# Patient Record
Sex: MALE | Race: WHITE | NOT HISPANIC OR LATINO | Employment: FULL TIME | URBAN - METROPOLITAN AREA
[De-identification: names, ages, dates, MRNs, and addresses within clinical notes are randomized per-mention and may not be internally consistent; named-entity substitution may affect disease eponyms.]

---

## 2020-09-18 ENCOUNTER — OFFICE VISIT (OUTPATIENT)
Dept: FAMILY MEDICINE CLINIC | Facility: CLINIC | Age: 30
End: 2020-09-18

## 2020-09-18 VITALS
HEART RATE: 77 BPM | OXYGEN SATURATION: 98 % | WEIGHT: 218 LBS | RESPIRATION RATE: 16 BRPM | DIASTOLIC BLOOD PRESSURE: 82 MMHG | SYSTOLIC BLOOD PRESSURE: 120 MMHG | HEIGHT: 74 IN | TEMPERATURE: 97.8 F | BODY MASS INDEX: 27.98 KG/M2

## 2020-09-18 DIAGNOSIS — F10.10 ALCOHOL ABUSE: ICD-10-CM

## 2020-09-18 DIAGNOSIS — R53.83 OTHER FATIGUE: ICD-10-CM

## 2020-09-18 DIAGNOSIS — J34.2 DEVIATED SEPTUM: ICD-10-CM

## 2020-09-18 DIAGNOSIS — Z13.220 SCREENING, LIPID: ICD-10-CM

## 2020-09-18 DIAGNOSIS — R06.83 SNORING: ICD-10-CM

## 2020-09-18 DIAGNOSIS — R06.81 WITNESSED APNEIC SPELLS: ICD-10-CM

## 2020-09-18 DIAGNOSIS — E10.649 TYPE 1 DIABETES MELLITUS WITH HYPOGLYCEMIA AND WITHOUT COMA (HCC): Primary | ICD-10-CM

## 2020-09-18 DIAGNOSIS — F51.01 PRIMARY INSOMNIA: ICD-10-CM

## 2020-09-18 DIAGNOSIS — K30 INDIGESTION: ICD-10-CM

## 2020-09-18 DIAGNOSIS — F41.9 ANXIETY: ICD-10-CM

## 2020-09-18 PROCEDURE — 99204 OFFICE O/P NEW MOD 45 MIN: CPT | Performed by: NURSE PRACTITIONER

## 2020-09-18 RX ORDER — LORAZEPAM 0.5 MG/1
0.5 TABLET ORAL EVERY 8 HOURS PRN
Qty: 30 TABLET | Refills: 0 | Status: SHIPPED | OUTPATIENT
Start: 2020-09-18 | End: 2022-08-29

## 2020-09-18 RX ORDER — VILAZODONE HYDROCHLORIDE 10 MG/1
10 TABLET ORAL DAILY
Qty: 7 TABLET | Refills: 0 | Status: SHIPPED | OUTPATIENT
Start: 2020-09-18 | End: 2020-10-07 | Stop reason: DRUGHIGH

## 2020-09-18 RX ORDER — INSULIN GLARGINE 100 [IU]/ML
36 INJECTION, SOLUTION SUBCUTANEOUS DAILY
COMMUNITY
End: 2022-08-29

## 2020-09-18 RX ORDER — VILAZODONE HYDROCHLORIDE 20 MG/1
20 TABLET ORAL DAILY
Qty: 7 TABLET | Refills: 0 | Status: SHIPPED | OUTPATIENT
Start: 2020-09-18 | End: 2020-10-07 | Stop reason: DRUGHIGH

## 2020-09-18 RX ORDER — VILAZODONE HYDROCHLORIDE 40 MG/1
40 TABLET ORAL DAILY
Qty: 90 TABLET | Refills: 1 | Status: SHIPPED | OUTPATIENT
Start: 2020-09-18 | End: 2022-08-29

## 2020-09-18 RX ORDER — PANTOPRAZOLE SODIUM 40 MG/1
40 TABLET, DELAYED RELEASE ORAL DAILY
Qty: 30 TABLET | Refills: 0 | Status: SHIPPED | OUTPATIENT
Start: 2020-09-18 | End: 2022-08-29

## 2020-09-18 NOTE — PROGRESS NOTES
Subjective   Kieran Canela is a 30 y.o. male.     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 Was diagnosed at 18 with sleep apnea but was never followed up on  Melatonin OTC no longer helping; drinking alcohol at night to help with sleep but really wants to stop this  Deviated septum    Vapes using 4mg nicotine mostly  never uses marijuana or illicit substances    The following portions of the patient's history were reviewed and updated as appropriate: allergies, current medications, past family history, past medical history, past social history, past surgical history and problem list.    Review of Systems   Constitutional: Positive for fatigue. Negative for activity change, appetite change, unexpected weight gain and unexpected weight loss.   HENT: Negative.    Eyes: Negative.  Negative for blurred vision.   Respiratory: Negative.  Negative for cough.    Cardiovascular: Negative.  Negative for chest pain and palpitations.   Gastrointestinal: Negative.    Endocrine: Negative.    Genitourinary: Negative.    Musculoskeletal: Negative.    Skin: Negative.    Allergic/Immunologic: Negative.    Neurological: Negative.    Hematological: Negative.    Psychiatric/Behavioral: Negative.  Negative for sleep disturbance and depressed mood. The patient is not nervous/anxious.     All other systems reviewed and are negative.      Objective   Physical Exam  Vitals signs reviewed.   Constitutional:       General: He is not in acute distress.     Appearance: Normal appearance. He is normal weight. He is not ill-appearing, toxic-appearing or diaphoretic.   HENT:      Head: Normocephalic and atraumatic.      Nose: Nose normal.   Eyes:      General: Lids are normal.      Pupils: Pupils are equal, round, and reactive to light.   Neck:      Musculoskeletal: Normal range of motion and neck supple. No neck rigidity or muscular tenderness.      Thyroid: No thyroid mass, thyromegaly or thyroid tenderness.      Vascular: No carotid bruit or JVD.      Trachea: Trachea and phonation normal.   Cardiovascular:      Rate and Rhythm: Normal rate and regular rhythm.      Pulses: Normal pulses.      Heart sounds: Normal heart sounds, S1 normal and S2 normal. No murmur. No friction rub. No gallop.    Pulmonary:      Effort: Pulmonary effort is normal.      Breath sounds: Normal breath sounds. No decreased breath sounds or wheezing.   Abdominal:      General: Abdomen is flat. Bowel sounds are normal.      Palpations: Abdomen is soft. There is no mass.      Tenderness: There is no abdominal tenderness.   Musculoskeletal:      Right lower leg: No edema.      Left lower leg: No edema.   Lymphadenopathy:      Head:      Right side of head: No tonsillar, preauricular or posterior auricular adenopathy.      Left side of head: No tonsillar, preauricular or posterior auricular adenopathy.      Cervical: No cervical adenopathy.   Skin:     General: Skin is warm and moist.      Capillary Refill: Capillary refill takes less than 2 seconds.   Neurological:      Mental Status: He is alert and oriented to person, place, and time.      Gait: Gait normal.      Deep Tendon Reflexes:      Reflex Scores:       Patellar reflexes are 2+ on the right side and 2+ on the left side.  Psychiatric:         Attention and Perception:  Attention and perception normal.         Mood and Affect: Mood is anxious. Mood is not depressed. Affect is not labile, flat or tearful.         Speech: Speech normal.         Behavior: Behavior normal. Behavior is not agitated, slowed, aggressive or withdrawn.         Thought Content: Thought content normal. Thought content is not paranoid or delusional.         Cognition and Memory: Cognition and memory normal.         Judgment: Judgment normal.           Assessment/Plan   Kieran was seen today for establish care, anxiety and insomnia.    Diagnoses and all orders for this visit:    Type 1 diabetes mellitus with hypoglycemia and without coma (CMS/[facility])  -     Hemoglobin A1c  -     Comprehensive Metabolic Panel  -     CBC & Differential  -     TSH    Primary insomnia    Other fatigue  -     Comprehensive Metabolic Panel  -     CBC & Differential  -     TSH  -     T4    Anxiety  -     LORazepam (Ativan) 0.5 MG tablet; Take 1 tablet by mouth Every 8 (Eight) Hours As Needed for Anxiety.  -     vilazodone (Viibryd) 10 MG tablet tablet; Take 1 tablet by mouth Daily.  -     vilazodone (Viibryd) 20 MG tablet tablet; Take 1 tablet by mouth Daily.  -     vilazodone (Viibryd) 40 MG tablet tablet; Take 1 tablet by mouth Daily.    Screening, lipid  -     Lipid panel    Alcohol abuse    will check labs and notify pt of results  Will start pt on Viibryd for anxiety since it can work quickly and have low sexual side effects and give a few Ativan to use as needed  Pt to avoid using alcohol if taking Ativan, he agrees and understands that this can cause respiratory suppression if takes Ativan with alcohol especially with suspected sleep apnea.  Will refer to sleep medicine to evaluate for sleep disorder or obstructive issue.  Will start pt on Pantoprazole for 1 month to see if this helps stomach discomfort. Avoid spicy foods and alcohol  Will see pt back in 3 months for follow up and 4 weeks for anxiety.  Needs to make appointment  with Endocrinology for evaluation and keep regular follow ups.  Pt agrees.  Off tonight from work to get started on medication, can resume work on Monday night 9/20/20

## 2020-09-19 LAB
ALBUMIN SERPL-MCNC: 4.8 G/DL (ref 4.1–5.2)
ALBUMIN/GLOB SERPL: 2.1 {RATIO} (ref 1.2–2.2)
ALP SERPL-CCNC: 92 IU/L (ref 39–117)
ALT SERPL-CCNC: 21 IU/L (ref 0–44)
AST SERPL-CCNC: 25 IU/L (ref 0–40)
BASOPHILS # BLD AUTO: 0.1 X10E3/UL (ref 0–0.2)
BASOPHILS NFR BLD AUTO: 2 %
BILIRUB SERPL-MCNC: 0.4 MG/DL (ref 0–1.2)
BUN SERPL-MCNC: 12 MG/DL (ref 6–20)
BUN/CREAT SERPL: 12 (ref 9–20)
CALCIUM SERPL-MCNC: 9.6 MG/DL (ref 8.7–10.2)
CHLORIDE SERPL-SCNC: 100 MMOL/L (ref 96–106)
CHOLEST SERPL-MCNC: 155 MG/DL (ref 100–199)
CO2 SERPL-SCNC: 25 MMOL/L (ref 20–29)
CREAT SERPL-MCNC: 0.98 MG/DL (ref 0.76–1.27)
EOSINOPHIL # BLD AUTO: 0.3 X10E3/UL (ref 0–0.4)
EOSINOPHIL NFR BLD AUTO: 7 %
ERYTHROCYTE [DISTWIDTH] IN BLOOD BY AUTOMATED COUNT: 12.7 % (ref 11.6–15.4)
GLOBULIN SER CALC-MCNC: 2.3 G/DL (ref 1.5–4.5)
GLUCOSE SERPL-MCNC: 102 MG/DL (ref 65–99)
HBA1C MFR BLD: 8.7 % (ref 4.8–5.6)
HCT VFR BLD AUTO: 46.1 % (ref 37.5–51)
HDLC SERPL-MCNC: 64 MG/DL
HGB BLD-MCNC: 15.7 G/DL (ref 13–17.7)
IMM GRANULOCYTES # BLD AUTO: 0 X10E3/UL (ref 0–0.1)
IMM GRANULOCYTES NFR BLD AUTO: 0 %
LDLC SERPL CALC-MCNC: 78 MG/DL (ref 0–99)
LYMPHOCYTES # BLD AUTO: 1.5 X10E3/UL (ref 0.7–3.1)
LYMPHOCYTES NFR BLD AUTO: 37 %
MCH RBC QN AUTO: 29.5 PG (ref 26.6–33)
MCHC RBC AUTO-ENTMCNC: 34.1 G/DL (ref 31.5–35.7)
MCV RBC AUTO: 87 FL (ref 79–97)
MONOCYTES # BLD AUTO: 0.5 X10E3/UL (ref 0.1–0.9)
MONOCYTES NFR BLD AUTO: 11 %
NEUTROPHILS # BLD AUTO: 1.7 X10E3/UL (ref 1.4–7)
NEUTROPHILS NFR BLD AUTO: 43 %
PLATELET # BLD AUTO: 340 X10E3/UL (ref 150–450)
POTASSIUM SERPL-SCNC: 4.3 MMOL/L (ref 3.5–5.2)
PROT SERPL-MCNC: 7.1 G/DL (ref 6–8.5)
RBC # BLD AUTO: 5.33 X10E6/UL (ref 4.14–5.8)
SODIUM SERPL-SCNC: 141 MMOL/L (ref 134–144)
T4 SERPL-MCNC: 7.3 UG/DL (ref 4.5–12)
TRIGL SERPL-MCNC: 64 MG/DL (ref 0–149)
TSH SERPL DL<=0.005 MIU/L-ACNC: 3.54 UIU/ML (ref 0.45–4.5)
VLDLC SERPL CALC-MCNC: 13 MG/DL (ref 5–40)
WBC # BLD AUTO: 4.1 X10E3/UL (ref 3.4–10.8)

## 2020-09-21 ENCOUNTER — TELEPHONE (OUTPATIENT)
Dept: FAMILY MEDICINE CLINIC | Facility: CLINIC | Age: 30
End: 2020-09-21

## 2020-09-21 NOTE — TELEPHONE ENCOUNTER
LOV 1/17/20  F/U appt booked 5/4/20 PATIENT CALLED AND SAID [facility] PAPERWORK WAS SUPPOSED TO HAVE BEEN FAXED IN ON Friday 9/18.  HE'S TRYING TO CONFIRM THAT IT WAS RECEIVED. THE  SAID THEY DON'T SEE ANYTHING YET.    PLEASE CONTACT THE PATIENT TO ADVISE.    CALLBACK:  [phone]

## 2020-09-22 NOTE — TELEPHONE ENCOUNTER
I haven't seen any FMLA forms, plus pt was only seen on Friday and did not bring any forms with him. I will complete them as soon as I get them and let the patient know. julian

## 2020-10-07 ENCOUNTER — TELEPHONE (OUTPATIENT)
Dept: FAMILY MEDICINE CLINIC | Facility: CLINIC | Age: 30
End: 2020-10-07

## 2020-10-07 DIAGNOSIS — F51.01 PRIMARY INSOMNIA: Primary | ICD-10-CM

## 2020-10-07 RX ORDER — QUETIAPINE FUMARATE 50 MG/1
50 TABLET, FILM COATED ORAL NIGHTLY
Qty: 90 TABLET | Refills: 0 | Status: SHIPPED | OUTPATIENT
Start: 2020-10-07 | End: 2022-08-29

## 2020-10-07 NOTE — TELEPHONE ENCOUNTER
Is pt needing something to help with sleep? Or anxiety? Is he taking Viibryd? Did he stop drinking alcohol? ajc

## 2020-10-07 NOTE — TELEPHONE ENCOUNTER
Patient is calling about LORazepam (Ativan) 0.5 MG tablet.  Patient states that it is not helping him and would like to now if there is something else he can try.  Please advise      navdeep Grierson call back [phone]

## 2020-10-07 NOTE — TELEPHONE ENCOUNTER
Spoke with pt he is aware taking the viibryd, however he is having racing thoughts at night that is making it hard to sleep. Per pt he is not using alcohol.

## 2020-10-14 ENCOUNTER — NURSE TRIAGE (OUTPATIENT)
Dept: CALL CENTER | Facility: HOSPITAL | Age: 30
End: 2020-10-14

## 2020-10-14 NOTE — TELEPHONE ENCOUNTER
"Caller states he wants to let his PCP know he has stopped taking Seroquel and Viibryd because he thinks they are making his BG high and they were not working for him. States he will call office tomorrow afternoon to discuss this with his PCP.     Reason for Disposition  • [1] Caller has NON-URGENT medication question about med that PCP prescribed AND [2] triager unable to answer question    Additional Information  • Negative: Drug overdose and triager unable to answer question  • Negative: Caller requesting information unrelated to medicine  • Negative: Caller requesting a prescription for Strep throat and has a positive culture result  • Negative: Rash while taking a medication or within 3 days of stopping it  • Negative: Immunization reaction suspected  • Negative: [1] Asthma and [2] having symptoms of asthma (cough, wheezing, etc.)  • Negative: [1] Influenza symptoms AND [2] anti-viral med prescription request, such as Tamiflu  • Negative: [1] Symptom of illness (e.g., headache, abdominal pain, earache, vomiting) AND [2] more than mild  • Negative: MORE THAN A DOUBLE DOSE of a prescription or over-the-counter (OTC) drug  • Negative: [1] DOUBLE DOSE (an extra dose or lesser amount) of over-the-counter (OTC) drug AND [2] any symptoms (e.g., dizziness, nausea, pain, sleepiness)  • Negative: [1] DOUBLE DOSE (an extra dose or lesser amount) of prescription drug AND [2] any symptoms (e.g., dizziness, nausea, pain, sleepiness)  • Negative: Took another person's prescription drug  • Negative: [1] DOUBLE DOSE (an extra dose or lesser amount) of prescription drug AND [2] NO symptoms (Exception: a double dose of antibiotics)  • Negative: Diabetes drug error or overdose (e.g., took wrong type of insulin or took extra dose)  • Negative: [1] Request for URGENT new prescription or refill of \"essential\" medication (i.e., likelihood of harm to patient if not taken) AND [2] triager unable to fill per unit policy  • Negative: [1] " "Prescription not at pharmacy AND [2] was prescribed by PCP recently  • Negative: [1] Pharmacy calling with prescription questions AND [2] triager unable to answer question  • Negative: [1] Caller has URGENT medication question about med that PCP or specialist prescribed AND [2] triager unable to answer question    Answer Assessment - Initial Assessment Questions  1.   NAME of MEDICATION: \"What medicine are you calling about?\"      Seraquel and Viibryd  2.   QUESTION: \"What is your question?\"      Want to let his PCP know he had stopped taking these because he thinks they are making his BG too high.   3.   PRESCRIBING HCP: \"Who prescribed it?\" Reason: if prescribed by specialist, call should be referred to that group.      Chana Womack APRN  4. SYMPTOMS: \"Do you have any symptoms?\"      High blood sugar  5. SEVERITY: If symptoms are present, ask \"Are they mild, moderate or severe?\"      moderate  6.  PREGNANCY:  \"Is there any chance that you are pregnant?\" \"When was your last menstrual period?\"      na    Protocols used: MEDICATION QUESTION CALL-ADULT-      "

## 2020-10-15 ENCOUNTER — TELEPHONE (OUTPATIENT)
Dept: FAMILY MEDICINE CLINIC | Facility: CLINIC | Age: 30
End: 2020-10-15

## 2020-10-15 DIAGNOSIS — F41.9 ANXIETY: ICD-10-CM

## 2020-10-15 DIAGNOSIS — F51.01 PRIMARY INSOMNIA: Primary | ICD-10-CM

## 2021-03-05 ENCOUNTER — TELEPHONE (OUTPATIENT)
Dept: FAMILY MEDICINE CLINIC | Facility: CLINIC | Age: 31
End: 2021-03-05

## 2021-03-05 NOTE — TELEPHONE ENCOUNTER
Caller: SrikanthbeKieran coffey    Relationship: Self    Best call back number: [phone] LEAVE A MESSAGE IF NO ANSWER     What medication are you requesting: NYSTATIN AND TRIAMCINOLONE ACETONIDE  CREAM    What are your current symptoms: BURNING AND ITCHY AROUND ANAL AREA    How long have you been experiencing symptoms:  ABOUT PROGRESSING OVER LAST 2 WEEKS    Have you had these symptoms before:    [x] Yes  [] No    Have you been treated for these symptoms before:   [x] Yes  [] No    If a prescription is needed, what is your preferred pharmacy and phone number: [facility] [address] [place] [address] [phone] [facility] [phone]      Additional notes:PATIENT STATED THAT HE KNOWS HE HAS A YEAST INFECTION HE HAS HAD THIS BEFORE BUT GOT IT TREATED AT  AND THIS IS THE MEDICATION THAT WAS PRESCRIBED TO HIM BEFORE    PLEASE ADVISE

## 2021-03-08 NOTE — TELEPHONE ENCOUNTER
PATIENT CALLED TO CHECK ON THE STATUS OF THIS MEDICATION REQUEST.    PATIENT CALL BACK [phone]

## 2021-03-09 NOTE — TELEPHONE ENCOUNTER
None of these medications on his list.     Needs to make office visit or can use OTC Clotrimazole and Hydrocortisone cream which are similar to what he is requesting. [email or web address]

## 2021-05-22 NOTE — TELEPHONE ENCOUNTER
I am going to have to refer you to Psychiatry for help with your anxiety and insomnia. I will place a referral however you may want to check with your insurance to find out who is in network. We use [facility] a lot because they have several locations in [place] and surrounding [facility] and have Psychiatrist and Psych Nurse Practitioners that can prescribe medications for you and are able to get pt in pretty quickly.   Once you get on medication that has improved your symptoms I will be happy to take over your treatment. [facility]  
Informed pt what Chana stated and that referral had been placed  
PATIENT STATES HE HAS DISCONTINUED vilazodone (Viibryd) 40 MG tablet tablet AND   QUEtiapine (SEROquel) 50 MG tablet.      HE FEELS THE VIIBRYD IS CAUSING HIS SUGAR TO SPIKE WHICH TAKES SEVERAL HOURS TO GET SUGAR TO DECREASE.      HE FEELS SEROQUEL IS CAUSING BAD DREAMS.    PATIENT WOULD LIKE ALTERNATIVE MEDICATIONS.     BEST CALL BACK  [phone]  
No

## 2022-08-29 ENCOUNTER — TELEPHONE (OUTPATIENT)
Dept: FAMILY MEDICINE CLINIC | Facility: CLINIC | Age: 32
End: 2022-08-29

## 2022-08-29 ENCOUNTER — OFFICE VISIT (OUTPATIENT)
Dept: FAMILY MEDICINE CLINIC | Facility: CLINIC | Age: 32
End: 2022-08-29

## 2022-08-29 VITALS
WEIGHT: 236 LBS | OXYGEN SATURATION: 98 % | HEART RATE: 80 BPM | TEMPERATURE: 98.9 F | RESPIRATION RATE: 16 BRPM | HEIGHT: 74 IN | DIASTOLIC BLOOD PRESSURE: 82 MMHG | SYSTOLIC BLOOD PRESSURE: 126 MMHG | BODY MASS INDEX: 30.29 KG/M2

## 2022-08-29 DIAGNOSIS — E10.65 TYPE 1 DIABETES MELLITUS WITH HYPERGLYCEMIA: ICD-10-CM

## 2022-08-29 DIAGNOSIS — B37.2 SKIN YEAST INFECTION: Primary | ICD-10-CM

## 2022-08-29 PROBLEM — K31.84 GASTROPARESIS: Status: ACTIVE | Noted: 2021-06-14

## 2022-08-29 PROCEDURE — 99213 OFFICE O/P EST LOW 20 MIN: CPT | Performed by: FAMILY MEDICINE

## 2022-08-29 RX ORDER — INSULIN LISPRO 100 [IU]/ML
INJECTION, SOLUTION INTRAVENOUS; SUBCUTANEOUS
COMMUNITY
Start: 2022-06-13

## 2022-08-29 NOTE — PROGRESS NOTES
"Chief Complaint  Establish Care (Last seen 2020-off boarding from Chana Womack. ) and Requesting cream for yeast infection    Subjective          Kieran Canela presents to [facility]  History of Present Illness   MELODY Moncada previous patient   Sees endocrinology at  for treatment of DM type 1. He has insulin pump. Overdue for follow up    He is having issues with yeast infection in groin and buttock region   This is a recurrent issue for him  Treats with a topical cream, but out of medication. Requesting a refill.     The following portions of the patient's history were reviewed and updated as appropriate: allergies, current medications, past family history, past medical history, past social history, past surgical history and problem list.    Objective   Vital Signs:   /82   Pulse 80   Temp 98.9 °F (37.2 °C)   Resp 16   Ht 188 cm (74\")   Wt 107 kg (236 lb)   SpO2 98%   BMI 30.30 kg/m²     Physical Exam  Vitals and nursing note reviewed.   Constitutional:       Appearance: Normal appearance. He is well-developed.   HENT:      Head: Normocephalic and atraumatic.      Right Ear: External ear normal.      Left Ear: External ear normal.   Eyes:      Conjunctiva/sclera: Conjunctivae normal.   Cardiovascular:      Rate and Rhythm: Normal rate and regular rhythm.      Heart sounds: Normal heart sounds.   Pulmonary:      Effort: Pulmonary effort is normal.      Breath sounds: Normal breath sounds.   Skin:     General: Skin is dry.   Neurological:      General: No focal deficit present.      Mental Status: He is alert and oriented to person, place, and time.        Result Review :                 Assessment and Plan    Diagnoses and all orders for this visit:    1. Skin yeast infection (Primary)  -     nystatin-triamcinolone (MYCOLOG II) 920638-7.1 UNIT/GM-% cream; Apply 1 application topically to the appropriate area as directed 2 (Two) Times a Day As Needed (rash). Apply " sparingly two times a day as needed.  Dispense: 60 g; Refill: 0    2. Type 1 diabetes mellitus with hyperglycemia (HCC)  Comments:  He would like to speak with nutritionist, referred to diabetic education. Will notify me if he wants referral to our endocrinology     Orders:  -     Ambulatory Referral to Diabetic Education        Follow Up   Return if symptoms worsen or fail to improve.  Patient was given instructions and counseling regarding his condition or for health maintenance advice. Please see specific information pulled into the AVS if appropriate.

## 2022-08-29 NOTE — TELEPHONE ENCOUNTER
Caller: Kieran Canela    Relationship: Self    Best call back number: [phone]    What medication are you requesting: ANYTHING FOR YEAST INFECTION    What are your current symptoms: YEAST INFECTION    Have you had these symptoms before:    [x] Yes  [] No    Have you been treated for these symptoms before:   [x] Yes  [] No    If a prescription is needed, what is your preferred pharmacy and phone number: [facility] [address] [place] [address] [phone] [facility] [phone]

## 2022-10-14 ENCOUNTER — OFFICE VISIT (OUTPATIENT)
Dept: FAMILY MEDICINE CLINIC | Facility: CLINIC | Age: 32
End: 2022-10-14

## 2022-10-14 VITALS
SYSTOLIC BLOOD PRESSURE: 132 MMHG | HEIGHT: 74 IN | HEART RATE: 78 BPM | WEIGHT: 237 LBS | BODY MASS INDEX: 30.42 KG/M2 | RESPIRATION RATE: 18 BRPM | TEMPERATURE: 97.5 F | DIASTOLIC BLOOD PRESSURE: 84 MMHG

## 2022-10-14 DIAGNOSIS — E78.00 PURE HYPERCHOLESTEROLEMIA: ICD-10-CM

## 2022-10-14 DIAGNOSIS — R79.89 ABNORMAL THYROID BLOOD TEST: ICD-10-CM

## 2022-10-14 DIAGNOSIS — E55.9 VITAMIN D DEFICIENCY: ICD-10-CM

## 2022-10-14 DIAGNOSIS — E10.649 TYPE 1 DIABETES MELLITUS WITH HYPOGLYCEMIA AND WITHOUT COMA: Primary | ICD-10-CM

## 2022-10-14 DIAGNOSIS — D64.9 ANEMIA, UNSPECIFIED TYPE: ICD-10-CM

## 2022-10-14 DIAGNOSIS — R53.82 CHRONIC FATIGUE: ICD-10-CM

## 2022-10-14 PROCEDURE — 99214 OFFICE O/P EST MOD 30 MIN: CPT | Performed by: FAMILY MEDICINE

## 2022-10-14 NOTE — PROGRESS NOTES
Subjective   Kieran Canela is a 32 y.o. male.     History of Present Illness     He is seeing [facility] for his DM  He is currently on a pump  rarely sees them though  Has not had levels checked in quite a while  He has had some changes in his life and he is worried that his DM control is not going to be good  They are adjusting his levels but have not done so in a long time  He has had high numbers at night but are normal in the day  Basal rate is 0.2      Pt is not taking any cholesterol medicine    He is tired as well  Has recently changed shifts and     Was told his thyroid was off one time  Not sure exactly what was going on    The following portions of the patient's history were reviewed and updated as appropriate: allergies, current medications, past family history, past medical history, past social history, past surgical history and problem list.    Review of Systems   Constitutional: Negative.    Psychiatric/Behavioral: Negative.        Objective   Physical Exam  Vitals and nursing note reviewed.   Constitutional:       General: He is not in acute distress.     Appearance: Normal appearance. He is well-developed.   Cardiovascular:      Rate and Rhythm: Normal rate and regular rhythm.      Heart sounds: Normal heart sounds.   Pulmonary:      Effort: Pulmonary effort is normal.      Breath sounds: Normal breath sounds.   Abdominal:      General: Abdomen is flat. Bowel sounds are normal.      Tenderness: There is no guarding or rebound.      Comments: Pump in place   Neurological:      Mental Status: He is alert and oriented to person, place, and time.   Psychiatric:         Mood and Affect: Mood normal.         Behavior: Behavior normal.         Thought Content: Thought content normal.         Judgment: Judgment normal.         Assessment & Plan   Diagnoses and all orders for this visit:    1. Type 1 diabetes mellitus with hypoglycemia and without coma (HCC) (Primary)  -     CBC & Differential  -      Comprehensive Metabolic Panel  -     Hemoglobin A1c    2. Pure hypercholesterolemia  -     Comprehensive Metabolic Panel  -     Lipid Panel    3. Chronic fatigue  -     Comprehensive Metabolic Panel  -     Testosterone  -     Vitamin B12 & Folate  -     Iron Profile    4. Anemia, unspecified type  -     Vitamin B12 & Folate  -     Iron Profile    5. Vitamin D deficiency  -     Vitamin D 25 Hydroxy    6. Abnormal thyroid blood test  -     TSH+Free T4    he has not seen his endocrinologist for pump nor regular follows ups since eraly this year.  Discussed with pt that this is very important for his overall health and he needs to see them..  Sugars are ok in the day but high in the PM.  Not on any oral medicine at this time.  Will check labs and consider farxiga for better control  He is tired, we will check labs but expect poor DM control to be an issue!  Has not been taking cholesterol medicine, will check lipids but may need statin.

## 2022-10-15 LAB
25(OH)D3+25(OH)D2 SERPL-MCNC: 27.8 NG/ML (ref 30–100)
ALBUMIN SERPL-MCNC: 4.7 G/DL (ref 4.1–5.2)
ALBUMIN/GLOB SERPL: 2.2 {RATIO} (ref 1.2–2.2)
ALP SERPL-CCNC: 106 IU/L (ref 44–121)
ALT SERPL-CCNC: 24 IU/L (ref 0–44)
AST SERPL-CCNC: 18 IU/L (ref 0–40)
BASOPHILS # BLD AUTO: 0.1 X10E3/UL (ref 0–0.2)
BASOPHILS NFR BLD AUTO: 1 %
BILIRUB SERPL-MCNC: 0.5 MG/DL (ref 0–1.2)
BUN SERPL-MCNC: 12 MG/DL (ref 6–20)
BUN/CREAT SERPL: 13 (ref 9–20)
CALCIUM SERPL-MCNC: 9.3 MG/DL (ref 8.7–10.2)
CHLORIDE SERPL-SCNC: 95 MMOL/L (ref 96–106)
CHOLEST SERPL-MCNC: 174 MG/DL (ref 100–199)
CO2 SERPL-SCNC: 23 MMOL/L (ref 20–29)
CREAT SERPL-MCNC: 0.96 MG/DL (ref 0.76–1.27)
EGFRCR SERPLBLD CKD-EPI 2021: 109 ML/MIN/1.73
EOSINOPHIL # BLD AUTO: 0.2 X10E3/UL (ref 0–0.4)
EOSINOPHIL NFR BLD AUTO: 4 %
ERYTHROCYTE [DISTWIDTH] IN BLOOD BY AUTOMATED COUNT: 12.2 % (ref 11.6–15.4)
FOLATE SERPL-MCNC: 16 NG/ML
GLOBULIN SER CALC-MCNC: 2.1 G/DL (ref 1.5–4.5)
GLUCOSE SERPL-MCNC: 351 MG/DL (ref 70–99)
HBA1C MFR BLD: 9.5 % (ref 4.8–5.6)
HCT VFR BLD AUTO: 46.6 % (ref 37.5–51)
HDLC SERPL-MCNC: 55 MG/DL
HGB BLD-MCNC: 15.2 G/DL (ref 13–17.7)
IMM GRANULOCYTES # BLD AUTO: 0 X10E3/UL (ref 0–0.1)
IMM GRANULOCYTES NFR BLD AUTO: 0 %
IRON SATN MFR SERPL: 25 % (ref 15–55)
IRON SERPL-MCNC: 90 UG/DL (ref 38–169)
LDLC SERPL CALC-MCNC: 108 MG/DL (ref 0–99)
LYMPHOCYTES # BLD AUTO: 1.6 X10E3/UL (ref 0.7–3.1)
LYMPHOCYTES NFR BLD AUTO: 34 %
MCH RBC QN AUTO: 28.7 PG (ref 26.6–33)
MCHC RBC AUTO-ENTMCNC: 32.6 G/DL (ref 31.5–35.7)
MCV RBC AUTO: 88 FL (ref 79–97)
MONOCYTES # BLD AUTO: 0.4 X10E3/UL (ref 0.1–0.9)
MONOCYTES NFR BLD AUTO: 10 %
NEUTROPHILS # BLD AUTO: 2.4 X10E3/UL (ref 1.4–7)
NEUTROPHILS NFR BLD AUTO: 51 %
PLATELET # BLD AUTO: 343 X10E3/UL (ref 150–450)
POTASSIUM SERPL-SCNC: 4.3 MMOL/L (ref 3.5–5.2)
PROT SERPL-MCNC: 6.8 G/DL (ref 6–8.5)
RBC # BLD AUTO: 5.29 X10E6/UL (ref 4.14–5.8)
SODIUM SERPL-SCNC: 132 MMOL/L (ref 134–144)
T4 FREE SERPL-MCNC: 1.38 NG/DL (ref 0.82–1.77)
TESTOST SERPL-MCNC: 407 NG/DL (ref 264–916)
TIBC SERPL-MCNC: 355 UG/DL (ref 250–450)
TRIGL SERPL-MCNC: 58 MG/DL (ref 0–149)
TSH SERPL DL<=0.005 MIU/L-ACNC: 4.02 UIU/ML (ref 0.45–4.5)
UIBC SERPL-MCNC: 265 UG/DL (ref 111–343)
VIT B12 SERPL-MCNC: 994 PG/ML (ref 232–1245)
VLDLC SERPL CALC-MCNC: 11 MG/DL (ref 5–40)
WBC # BLD AUTO: 4.7 X10E3/UL (ref 3.4–10.8)

## 2022-10-18 DIAGNOSIS — E78.00 HYPERCHOLESTEREMIA: ICD-10-CM

## 2022-10-18 DIAGNOSIS — E10.649 TYPE 1 DIABETES MELLITUS WITH HYPOGLYCEMIA AND WITHOUT COMA: Primary | ICD-10-CM

## 2022-10-18 RX ORDER — SITAGLIPTIN AND METFORMIN HYDROCHLORIDE 50; 500 MG/1; MG/1
TABLET, FILM COATED, EXTENDED RELEASE ORAL
Qty: 60 TABLET | Refills: 3 | Status: SHIPPED | OUTPATIENT
Start: 2022-10-18

## 2022-10-18 RX ORDER — ATORVASTATIN CALCIUM 40 MG/1
40 TABLET, FILM COATED ORAL NIGHTLY
Qty: 30 TABLET | Refills: 3 | Status: SHIPPED | OUTPATIENT
Start: 2022-10-18

## 2023-08-04 ENCOUNTER — OFFICE VISIT (OUTPATIENT)
Dept: FAMILY MEDICINE CLINIC | Facility: CLINIC | Age: 33
End: 2023-08-04
Payer: COMMERCIAL

## 2023-08-04 VITALS
BODY MASS INDEX: 30.24 KG/M2 | RESPIRATION RATE: 18 BRPM | SYSTOLIC BLOOD PRESSURE: 122 MMHG | HEIGHT: 74 IN | TEMPERATURE: 98 F | DIASTOLIC BLOOD PRESSURE: 68 MMHG | OXYGEN SATURATION: 98 % | WEIGHT: 235.6 LBS | HEART RATE: 75 BPM

## 2023-08-04 DIAGNOSIS — J30.2 SEASONAL ALLERGIES: Primary | ICD-10-CM

## 2023-08-04 PROBLEM — Z96.41 INSULIN PUMP IN PLACE: Status: ACTIVE | Noted: 2022-01-23

## 2023-08-04 PROCEDURE — 99214 OFFICE O/P EST MOD 30 MIN: CPT | Performed by: FAMILY MEDICINE

## 2023-08-04 RX ORDER — FEXOFENADINE HCL 180 MG/1
180 TABLET ORAL DAILY
Qty: 30 TABLET | Refills: 3 | Status: SHIPPED | OUTPATIENT
Start: 2023-08-04

## 2023-08-04 RX ORDER — MECLIZINE HYDROCHLORIDE 25 MG/1
TABLET ORAL
Qty: 40 TABLET | Refills: 1 | Status: SHIPPED | OUTPATIENT
Start: 2023-08-04

## 2023-08-04 RX ORDER — OLOPATADINE HYDROCHLORIDE 2 MG/ML
1 SOLUTION/ DROPS OPHTHALMIC DAILY
Qty: 2.5 ML | Refills: 5 | Status: SHIPPED | OUTPATIENT
Start: 2023-08-04

## 2023-08-04 RX ORDER — PREDNISONE 20 MG/1
40 TABLET ORAL DAILY
Qty: 6 TABLET | Refills: 0 | Status: SHIPPED | OUTPATIENT
Start: 2023-08-04

## 2023-08-04 RX ORDER — FLUTICASONE PROPIONATE 50 MCG
2 SPRAY, SUSPENSION (ML) NASAL DAILY
Qty: 11 ML | Refills: 5 | Status: SHIPPED | OUTPATIENT
Start: 2023-08-04

## 2023-09-11 ENCOUNTER — OFFICE VISIT (OUTPATIENT)
Dept: FAMILY MEDICINE CLINIC | Facility: CLINIC | Age: 33
End: 2023-09-11
Payer: COMMERCIAL

## 2023-09-11 VITALS
HEART RATE: 72 BPM | HEIGHT: 74 IN | TEMPERATURE: 98.6 F | BODY MASS INDEX: 30.88 KG/M2 | DIASTOLIC BLOOD PRESSURE: 78 MMHG | SYSTOLIC BLOOD PRESSURE: 130 MMHG | WEIGHT: 240.6 LBS | OXYGEN SATURATION: 97 % | RESPIRATION RATE: 20 BRPM

## 2023-09-11 DIAGNOSIS — F41.9 ANXIETY: ICD-10-CM

## 2023-09-11 DIAGNOSIS — F10.20 ALCOHOLISM: Primary | ICD-10-CM

## 2023-09-11 DIAGNOSIS — E10.649 TYPE 1 DIABETES MELLITUS WITH HYPOGLYCEMIA AND WITHOUT COMA: ICD-10-CM

## 2023-09-11 DIAGNOSIS — E55.9 VITAMIN D DEFICIENCY: ICD-10-CM

## 2023-09-11 DIAGNOSIS — R11.0 NAUSEA: ICD-10-CM

## 2023-09-11 DIAGNOSIS — R53.1 GENERALIZED WEAKNESS: ICD-10-CM

## 2023-09-11 PROCEDURE — 99214 OFFICE O/P EST MOD 30 MIN: CPT | Performed by: FAMILY MEDICINE

## 2023-09-11 NOTE — PROGRESS NOTES
"Chief Complaint   Patient presents with    nausea, weakness, \"foggy headed\"     On & off for the past few months    Alcohol Problem     Pt admits to having issues with alcohol for the past 3 yrs and stopped drinking this past Friday out of concern for these above mentioned issues.        Subjective      Kieran Canela is a 33 y.o. who presents for weakness, mild nausea, mild confusion that  he reports been present for the past few months but later admits symptoms seem worse over the last few days as he has stopped drinking alcohol.  Previously the patient would drink 1/5 of whiskey per night.  His last drink of alcohol was Thursday evening.  Patient has been drinking alcohol this way for the last 3 to 4 years.  He has had a period of time in his life and he was able to significantly decrease alcohol use for 2 to 3 months.  Patient drinks to calm his nerves and this will also apparently diminish some of the sensation in his feet from diabetic peripheral neuropathy.    The following portions of the patient's history were reviewed and updated as appropriate: allergies, current medications, past family history, past medical history, past social history, and problem list.    Review of Systems    Objective   Vital Signs:  /78   Pulse 72   Temp 98.6 °F (37 °C)   Resp 20   Ht 188 cm (74\")   Wt 109 kg (240 lb 9.6 oz)   SpO2 97%   BMI 30.89 kg/m²             Physical Exam  Constitutional:       Appearance: Normal appearance.   HENT:      Head: Normocephalic and atraumatic.      Right Ear: Tympanic membrane and ear canal normal.      Left Ear: Tympanic membrane and ear canal normal.      Nose: Nose normal.      Mouth/Throat:      Mouth: Mucous membranes are moist.      Pharynx: Oropharynx is clear.   Eyes:      Conjunctiva/sclera: Conjunctivae normal.   Cardiovascular:      Rate and Rhythm: Normal rate and regular rhythm.      Heart sounds: Normal heart sounds. No murmur heard.  Pulmonary:      Effort: Pulmonary " effort is normal. No respiratory distress.      Breath sounds: Normal breath sounds.   Abdominal:      General: Abdomen is flat. Bowel sounds are normal. There is no distension.      Palpations: Abdomen is soft.      Tenderness: There is no abdominal tenderness.   Musculoskeletal:      Cervical back: Normal range of motion and neck supple. No tenderness.   Lymphadenopathy:      Cervical: No cervical adenopathy.   Skin:     General: Skin is warm and dry.   Neurological:      General: No focal deficit present.      Mental Status: He is alert.      Sensory: No sensory deficit.      Motor: No weakness.   Psychiatric:         Mood and Affect: Mood normal.        Result Review                     Assessment and Plan  Diagnoses and all orders for this visit:    1. Alcoholism (Primary)  -     CBC & Differential  -     Comprehensive Metabolic Panel  -     Lipid Panel  -     Vitamin D,25-Hydroxy  -     Vitamin B12  -     Folate  -     Vitamin B1, Whole Blood  -     Ambulatory Referral to Behavioral Health    2. Generalized weakness    3. Nausea  -     CBC & Differential  -     Comprehensive Metabolic Panel  -     Vitamin B1, Whole Blood    4. Vitamin D deficiency  -     Vitamin D,25-Hydroxy    5. Anxiety  -     Ambulatory Referral to Behavioral Health    6. Type 1 diabetes mellitus with hypoglycemia and without coma    Other orders  -     nystatin-triamcinolone (MYCOLOG II) 606152-5.1 UNIT/GM-% cream; Apply 1 application  topically to the appropriate area as directed 2 (Two) Times a Day.  Dispense: 60 g; Refill: 0    Plan  1.  Alcoholism related labs ordered and deficiencies will be corrected.  We did discuss the role of alcohol and its impact on his blood sugars.  AA was recommended  2.  Patient will be referred to behavioral health regarding his associated anxiety.        Follow Up  No follow-ups on file.  Patient was given instructions and counseling regarding his condition or for health maintenance advice. Please see  specific information pulled into the AVS if appropriate.

## 2023-09-13 LAB
25(OH)D3+25(OH)D2 SERPL-MCNC: 33 NG/ML (ref 30–100)
ALBUMIN SERPL-MCNC: 4.7 G/DL (ref 4.1–5.1)
ALBUMIN/GLOB SERPL: 2.1 {RATIO} (ref 1.2–2.2)
ALP SERPL-CCNC: 128 IU/L (ref 44–121)
ALT SERPL-CCNC: 28 IU/L (ref 0–44)
AST SERPL-CCNC: 23 IU/L (ref 0–40)
BASOPHILS # BLD AUTO: 0.1 X10E3/UL (ref 0–0.2)
BASOPHILS NFR BLD AUTO: 1 %
BILIRUB SERPL-MCNC: 0.4 MG/DL (ref 0–1.2)
BUN SERPL-MCNC: 13 MG/DL (ref 6–20)
BUN/CREAT SERPL: 15 (ref 9–20)
CALCIUM SERPL-MCNC: 9.5 MG/DL (ref 8.7–10.2)
CHLORIDE SERPL-SCNC: 98 MMOL/L (ref 96–106)
CHOLEST SERPL-MCNC: 180 MG/DL (ref 100–199)
CO2 SERPL-SCNC: 26 MMOL/L (ref 20–29)
CREAT SERPL-MCNC: 0.87 MG/DL (ref 0.76–1.27)
EGFRCR SERPLBLD CKD-EPI 2021: 118 ML/MIN/1.73
EOSINOPHIL # BLD AUTO: 0.4 X10E3/UL (ref 0–0.4)
EOSINOPHIL NFR BLD AUTO: 7 %
ERYTHROCYTE [DISTWIDTH] IN BLOOD BY AUTOMATED COUNT: 12.2 % (ref 11.6–15.4)
FOLATE SERPL-MCNC: 9.2 NG/ML
GLOBULIN SER CALC-MCNC: 2.2 G/DL (ref 1.5–4.5)
GLUCOSE SERPL-MCNC: 261 MG/DL (ref 70–99)
HCT VFR BLD AUTO: 49 % (ref 37.5–51)
HDLC SERPL-MCNC: 52 MG/DL
HGB BLD-MCNC: 16.4 G/DL (ref 13–17.7)
IMM GRANULOCYTES # BLD AUTO: 0 X10E3/UL (ref 0–0.1)
IMM GRANULOCYTES NFR BLD AUTO: 0 %
LDLC SERPL CALC-MCNC: 114 MG/DL (ref 0–99)
LYMPHOCYTES # BLD AUTO: 1.3 X10E3/UL (ref 0.7–3.1)
LYMPHOCYTES NFR BLD AUTO: 20 %
MCH RBC QN AUTO: 30.2 PG (ref 26.6–33)
MCHC RBC AUTO-ENTMCNC: 33.5 G/DL (ref 31.5–35.7)
MCV RBC AUTO: 90 FL (ref 79–97)
MONOCYTES # BLD AUTO: 0.4 X10E3/UL (ref 0.1–0.9)
MONOCYTES NFR BLD AUTO: 7 %
NEUTROPHILS # BLD AUTO: 4.1 X10E3/UL (ref 1.4–7)
NEUTROPHILS NFR BLD AUTO: 65 %
PLATELET # BLD AUTO: 322 X10E3/UL (ref 150–450)
POTASSIUM SERPL-SCNC: 4.8 MMOL/L (ref 3.5–5.2)
PROT SERPL-MCNC: 6.9 G/DL (ref 6–8.5)
RBC # BLD AUTO: 5.43 X10E6/UL (ref 4.14–5.8)
SODIUM SERPL-SCNC: 137 MMOL/L (ref 134–144)
TRIGL SERPL-MCNC: 73 MG/DL (ref 0–149)
VIT B1 BLD-SCNC: 134.5 NMOL/L (ref 66.5–200)
VIT B12 SERPL-MCNC: 876 PG/ML (ref 232–1245)
VLDLC SERPL CALC-MCNC: 14 MG/DL (ref 5–40)
WBC # BLD AUTO: 6.2 X10E3/UL (ref 3.4–10.8)

## 2023-10-02 ENCOUNTER — TELEMEDICINE (OUTPATIENT)
Dept: PSYCHIATRY | Facility: CLINIC | Age: 33
End: 2023-10-02
Payer: COMMERCIAL

## 2023-10-02 DIAGNOSIS — F41.1 GENERALIZED ANXIETY DISORDER: ICD-10-CM

## 2023-10-02 DIAGNOSIS — G47.9 SLEEP DISTURBANCE: ICD-10-CM

## 2023-10-02 DIAGNOSIS — Z79.899 MEDICATION MANAGEMENT: ICD-10-CM

## 2023-10-02 DIAGNOSIS — F33.1 MODERATE EPISODE OF RECURRENT MAJOR DEPRESSIVE DISORDER: Primary | ICD-10-CM

## 2023-10-02 PROCEDURE — 90792 PSYCH DIAG EVAL W/MED SRVCS: CPT

## 2023-10-02 RX ORDER — HYDROXYZINE HYDROCHLORIDE 10 MG/1
TABLET, FILM COATED ORAL
Qty: 180 TABLET | Refills: 0 | Status: SHIPPED | OUTPATIENT
Start: 2023-10-02 | End: 2023-10-09 | Stop reason: SINTOL
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
## 2023-10-02 NOTE — TREATMENT PLAN
Multi-Disciplinary Problems (from Behavioral Health Treatment Plan)      Active Problems       Problem: Anxiety  Start Date: 10/02/23      Problem Details: The patient self-scales this problem as a 10 with 10 being the worst.          Goal Priority Start Date Expected End Date End Date    Patient will develop and implement behavioral and cognitive strategies to reduce anxiety and irrational fears. -- 10/02/23 -- --    Goal Details: Progress toward goal:  Not appropriate to rate progress toward goal since this is the initial treatment plan.          Goal Intervention Frequency Start Date End Date    Help patient explore past emotional issues in relation to present anxiety. PRN 10/02/23 --    Intervention Details: Duration of treatment until until discharged.          Goal Intervention Frequency Start Date End Date    Help patient develop an awareness of their cognitive and physical responses to anxiety. PRN 10/02/23 --    Intervention Details: Duration of treatment until until discharged.                  Problem: Depression  Start Date: 10/02/23      Problem Details: The patient self-scales this problem as a 1 with 10 being the worst.          Goal Priority Start Date Expected End Date End Date    Patient will demonstrate the ability to initiate new constructive life skills outside of sessions on a consistent basis. -- 10/02/23 -- --    Goal Details: Progress toward goal:  Not appropriate to rate progress toward goal since this is the initial treatment plan.          Goal Intervention Frequency Start Date End Date    Assist patient in setting attainable activities of daily living goals. PRN 10/02/23 --      Goal Intervention Frequency Start Date End Date    Provide education about depression PRN 10/02/23 --    Intervention Details: Duration of treatment until until discharged.          Goal Intervention Frequency Start Date End Date    Assist patient in developing healthy coping strategies. PRN 10/02/23 --     Intervention Details: Duration of treatment until until discharged.                               I have discussed and reviewed this treatment plan with the patient and/or guardian.  The patient has verbally agreed with this treatment plan (no signatures are obtained at today's visit as the patient is a telehealth patient and is unable to print and sign this document, therefore verbal agreement is obtained).

## 2023-10-09 ENCOUNTER — TELEPHONE (OUTPATIENT)
Dept: PSYCHIATRY | Facility: CLINIC | Age: 33
End: 2023-10-09
Payer: COMMERCIAL

## 2023-10-09 DIAGNOSIS — G47.9 SLEEP DISTURBANCE: ICD-10-CM

## 2023-10-09 DIAGNOSIS — F41.1 GENERALIZED ANXIETY DISORDER: Primary | ICD-10-CM

## 2023-10-09 RX ORDER — PROPRANOLOL HYDROCHLORIDE 10 MG/1
10 TABLET ORAL 2 TIMES DAILY PRN
Qty: 60 TABLET | Refills: 0 | Status: SHIPPED | OUTPATIENT
Start: 2023-10-09

## 2023-10-09 RX ORDER — TRAZODONE HYDROCHLORIDE 50 MG/1
25-50 TABLET ORAL NIGHTLY PRN
Qty: 30 TABLET | Refills: 0 | Status: SHIPPED | OUTPATIENT
Start: 2023-10-09

## 2023-10-09 NOTE — TELEPHONE ENCOUNTER
Pt called stating that Hydroxyzine he is not able to sleep.Pt has stop taking the medication three days ago.Please advise

## 2023-10-09 NOTE — PROGRESS NOTES
Patient called stating atarax keeps him awake instead of causing sedation. Will discontinue and start trazodone half tab nightly as needed for sleep, if not resolved increase to a full tab (50 mg) nightly as needed. For anxiety, propranolol 10 mg BID PRN. Patient is educated upon risks versus benefits as well as side effects and when to seek care in an emergency setting.  Patient verbalized understanding.

## 2023-11-07 ENCOUNTER — TELEMEDICINE (OUTPATIENT)
Dept: PSYCHIATRY | Facility: CLINIC | Age: 33
End: 2023-11-07
Payer: COMMERCIAL

## 2023-11-07 DIAGNOSIS — F41.1 GENERALIZED ANXIETY DISORDER: ICD-10-CM

## 2023-11-07 DIAGNOSIS — F33.1 MODERATE EPISODE OF RECURRENT MAJOR DEPRESSIVE DISORDER: Primary | ICD-10-CM

## 2023-11-07 DIAGNOSIS — G47.9 SLEEP DISTURBANCE: ICD-10-CM

## 2023-11-07 DIAGNOSIS — Z79.899 MEDICATION MANAGEMENT: ICD-10-CM

## 2023-11-07 RX ORDER — BUSPIRONE HYDROCHLORIDE 10 MG/1
10 TABLET ORAL 3 TIMES DAILY
Qty: 90 TABLET | Refills: 0 | Status: SHIPPED | OUTPATIENT
Start: 2023-11-07

## 2023-11-07 RX ORDER — TRAZODONE HYDROCHLORIDE 50 MG/1
25-50 TABLET ORAL NIGHTLY PRN
Qty: 30 TABLET | Refills: 0 | Status: SHIPPED | OUTPATIENT
Start: 2023-11-07
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
## 2023-12-06 ENCOUNTER — TELEMEDICINE (OUTPATIENT)
Dept: PSYCHIATRY | Facility: CLINIC | Age: 33
End: 2023-12-06
Payer: COMMERCIAL

## 2023-12-06 DIAGNOSIS — G47.9 SLEEP DISTURBANCE: ICD-10-CM

## 2023-12-06 DIAGNOSIS — F33.1 MODERATE EPISODE OF RECURRENT MAJOR DEPRESSIVE DISORDER: Primary | ICD-10-CM

## 2023-12-06 DIAGNOSIS — Z79.899 MEDICATION MANAGEMENT: ICD-10-CM

## 2023-12-06 DIAGNOSIS — F41.1 GENERALIZED ANXIETY DISORDER: ICD-10-CM

## 2023-12-06 RX ORDER — PROPRANOLOL HYDROCHLORIDE 10 MG/1
10 TABLET ORAL 2 TIMES DAILY PRN
Qty: 60 TABLET | Refills: 0 | Status: SHIPPED | OUTPATIENT
Start: 2023-12-06

## 2023-12-06 RX ORDER — IBUPROFEN 600 MG/1
TABLET ORAL
COMMUNITY
Start: 2023-11-19

## 2023-12-06 RX ORDER — TRAZODONE HYDROCHLORIDE 100 MG/1
100 TABLET ORAL NIGHTLY PRN
Qty: 30 TABLET | Refills: 0 | Status: SHIPPED | OUTPATIENT
Start: 2023-12-06
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
## 2023-12-12 ENCOUNTER — TELEPHONE (OUTPATIENT)
Dept: FAMILY MEDICINE CLINIC | Facility: CLINIC | Age: 33
End: 2023-12-12
Payer: COMMERCIAL

## 2023-12-12 DIAGNOSIS — M79.672 LEFT FOOT PAIN: Primary | ICD-10-CM

## 2023-12-12 NOTE — TELEPHONE ENCOUNTER
Pt called asking if he could get a referral for podiatry. He said he has an appointment scheduled for this Friday, 12/15, at [facility] with a Dr. Verna Buchanan and was hoping to get a referral by then. Wasn't sure if he needed to be seen first or if one could be put in. Fax number for podiatry is [phone].

## 2023-12-13 NOTE — TELEPHONE ENCOUNTER
Spoke w/ pt and actually he cannot get an appt with podiatry without the referral. Pt is having L mid foot and heel pain. Pt state's he is also a diabetic. This has been for the past 3 months. No one here has seen him for this previously.

## 2023-12-21 ENCOUNTER — HOSPITAL ENCOUNTER (OUTPATIENT)
Dept: HOSPITAL 22 - RAD | Age: 33
End: 2023-12-21
Payer: COMMERCIAL

## 2023-12-21 DIAGNOSIS — M79.672: ICD-10-CM

## 2023-12-21 DIAGNOSIS — M79.671: Primary | ICD-10-CM

## 2023-12-21 PROCEDURE — 73630 X-RAY EXAM OF FOOT: CPT

## 2024-01-16 ENCOUNTER — TELEMEDICINE (OUTPATIENT)
Dept: PSYCHIATRY | Facility: CLINIC | Age: 34
End: 2024-01-16
Payer: COMMERCIAL

## 2024-01-16 DIAGNOSIS — G47.9 SLEEP DISTURBANCE: ICD-10-CM

## 2024-01-16 DIAGNOSIS — F33.1 MODERATE EPISODE OF RECURRENT MAJOR DEPRESSIVE DISORDER: Primary | ICD-10-CM

## 2024-01-16 DIAGNOSIS — F41.1 GENERALIZED ANXIETY DISORDER: ICD-10-CM

## 2024-01-16 DIAGNOSIS — Z79.899 MEDICATION MANAGEMENT: ICD-10-CM

## 2024-01-16 PROCEDURE — 99214 OFFICE O/P EST MOD 30 MIN: CPT

## 2024-01-16 RX ORDER — OSELTAMIVIR PHOSPHATE 75 MG/1
1 CAPSULE ORAL EVERY 12 HOURS SCHEDULED
COMMUNITY
Start: 2024-01-12

## 2024-01-16 RX ORDER — MELOXICAM 7.5 MG/1
TABLET ORAL
COMMUNITY
Start: 2023-12-27

## 2024-01-16 RX ORDER — SULFAMETHOXAZOLE AND TRIMETHOPRIM 800; 160 MG/1; MG/1
1 TABLET ORAL EVERY 12 HOURS SCHEDULED
COMMUNITY
Start: 2023-12-27

## 2024-01-16 RX ORDER — TRAZODONE HYDROCHLORIDE 100 MG/1
100 TABLET ORAL NIGHTLY PRN
Qty: 30 TABLET | Refills: 0 | Status: SHIPPED | OUTPATIENT
Start: 2024-01-16

## 2024-01-16 RX ORDER — PROPRANOLOL HYDROCHLORIDE 10 MG/1
10 TABLET ORAL 2 TIMES DAILY PRN
Qty: 60 TABLET | Refills: 0 | Status: SHIPPED | OUTPATIENT
Start: 2024-01-16

## 2024-01-16 RX ORDER — AMOXICILLIN AND CLAVULANATE POTASSIUM 875; 125 MG/1; MG/1
1 TABLET, FILM COATED ORAL EVERY 12 HOURS SCHEDULED
COMMUNITY
Start: 2024-01-12
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
## 2024-01-17 RX ORDER — PROPRANOLOL HYDROCHLORIDE 10 MG/1
10 TABLET ORAL 2 TIMES DAILY PRN
Qty: 180 TABLET | OUTPATIENT
Start: 2024-01-17

## 2024-01-25 ENCOUNTER — OFFICE VISIT (OUTPATIENT)
Dept: FAMILY MEDICINE CLINIC | Facility: CLINIC | Age: 34
End: 2024-01-25
Payer: COMMERCIAL

## 2024-01-25 VITALS
SYSTOLIC BLOOD PRESSURE: 110 MMHG | DIASTOLIC BLOOD PRESSURE: 72 MMHG | WEIGHT: 243 LBS | TEMPERATURE: 99.1 F | HEART RATE: 94 BPM | RESPIRATION RATE: 16 BRPM | BODY MASS INDEX: 31.18 KG/M2 | HEIGHT: 74 IN | OXYGEN SATURATION: 97 %

## 2024-01-25 DIAGNOSIS — F10.20 ALCOHOLISM: ICD-10-CM

## 2024-01-25 DIAGNOSIS — E10.649 TYPE 1 DIABETES MELLITUS WITH HYPOGLYCEMIA AND WITHOUT COMA: ICD-10-CM

## 2024-01-25 DIAGNOSIS — R42 LIGHTHEADEDNESS: Primary | ICD-10-CM

## 2024-01-25 PROCEDURE — 99214 OFFICE O/P EST MOD 30 MIN: CPT

## 2024-01-25 RX ORDER — ALBUTEROL SULFATE 90 UG/1
AEROSOL, METERED RESPIRATORY (INHALATION)
COMMUNITY
Start: 2024-01-18

## 2024-01-25 NOTE — PROGRESS NOTES
"Chief Complaint   Patient presents with    Dizziness     With shaking after having flu, Tried to go back Monday to work but was vomiting and shaking when he gets up moving a lot. May need labs while he is here. Pt is fasting just power add no sugar       Subjective      Kieran Canela is a 33 y.o. who presents for dizziness. States he had the flu the week of 1/12/2024. Was diagnosed by [facility]. States since then he has felt off.  States he feels lightheaded more than dizzy. He also feels shaky at times.  States he has been drinking plenty of water and powerade.  Drinks alcohol on the weekends.  Drank last night.  Has been off work. States his sugar has been running 100-200. Last A1C was 8.8.  States that his sugar has been better controlled than previously.  States he has cut back on alcohol use over the last 6 months.    3 rounds steroids in the last three weeks between treating foot pain by podiatry and ongoing illness after the flu.       Has been off work since he had the flu on the 12th.  Tried to return to work but was too lightheaded to be able to work safely. Works for [employer] in [employer].        The following portions of the patient's history were reviewed and updated as appropriate: allergies, current medications, past family history, past medical history, past social history, past surgical history, and problem list.    Review of Systems   Constitutional:  Positive for fatigue. Negative for chills and fever.   Respiratory:  Positive for cough (states dry cough). Negative for chest tightness and shortness of breath.    Cardiovascular:  Negative for chest pain.   Gastrointestinal:  Positive for diarrhea. Negative for constipation.   Neurological:  Positive for dizziness (has had vertigo in the past, but this feels different.) and light-headedness.       Objective   Vital Signs:  /72   Pulse 94   Temp 99.1 °F (37.3 °C)   Resp 16   Ht 188 cm (74\")   Wt 110 kg (243 lb)   SpO2 97%   BMI 31.20 " kg/m²               Physical Exam  Constitutional:       Appearance: Normal appearance.   HENT:      Right Ear: Tympanic membrane, ear canal and external ear normal.      Left Ear: There is impacted cerumen.   Eyes:      Extraocular Movements: Extraocular movements intact.      Pupils: Pupils are equal, round, and reactive to light.   Cardiovascular:      Rate and Rhythm: Normal rate and regular rhythm.      Heart sounds: Normal heart sounds.   Pulmonary:      Breath sounds: Normal breath sounds.   Neurological:      Mental Status: He is alert.      Cranial Nerves: Cranial nerves 2-12 are intact.      Motor: Motor function is intact.      Coordination: Coordination is intact. Romberg sign negative.   Psychiatric:         Mood and Affect: Mood normal.         Behavior: Behavior normal.          Result Review                     Assessment and Plan  Diagnoses and all orders for this visit:    1. Lightheadedness (Primary)  -     CBC (No Diff)  -     Comprehensive Metabolic Panel    2. Alcoholism    3. Type 1 diabetes mellitus with hypoglycemia and without coma      Labs today.  Off work for the next week, suspect that patient is likely having some side effects of multiple rounds of steroids given that he is diabetic and has felt shaky and lightheaded over the last few weeks.   Recommended cutting back on alcohol use to avoid further dehydration.   Continue monitoring glucose levels.  Follow up in 1 week, plan for return to work at that time.         Discussed medications prescribed and OTC medications recommended.  Discussed medication safety, possible side effects and how to take or administer medications. Instructed patient to report any adverse reactions, side effects or concerns.     Reviewed physical exam findings and plan with patient who verbalized understanding and agrees with plan of care. Patient was given opportunity to ask questions and all concerns were addressed prior to the conclusion of today's visit.      Follow Up  Return in about 1 week (around 2/1/2024) for Recheck.  Patient was given instructions and counseling regarding his condition or for health maintenance advice. Please see specific information pulled into the AVS if appropriate.     Note to patient: The 21st Century Cures Act makes medical notes like these available to patients in the interest of transparency. However, be advised this is a medical document. It is intended as peer to peer communication. It is written in medical language and may contain abbreviations or verbiage that are unfamiliar. It may appear blunt or direct. Medical documents are intended to carry relevant information, facts as evident, and the clinical opinion of the provider.

## 2024-01-26 LAB
ALBUMIN SERPL-MCNC: 4.7 G/DL (ref 4.1–5.1)
ALBUMIN/GLOB SERPL: 2 {RATIO} (ref 1.2–2.2)
ALP SERPL-CCNC: 117 IU/L (ref 44–121)
ALT SERPL-CCNC: 26 IU/L (ref 0–44)
AST SERPL-CCNC: 23 IU/L (ref 0–40)
BILIRUB SERPL-MCNC: 0.3 MG/DL (ref 0–1.2)
BUN SERPL-MCNC: 10 MG/DL (ref 6–20)
BUN/CREAT SERPL: 12 (ref 9–20)
CALCIUM SERPL-MCNC: 9.8 MG/DL (ref 8.7–10.2)
CHLORIDE SERPL-SCNC: 101 MMOL/L (ref 96–106)
CO2 SERPL-SCNC: 22 MMOL/L (ref 20–29)
CREAT SERPL-MCNC: 0.82 MG/DL (ref 0.76–1.27)
EGFRCR SERPLBLD CKD-EPI 2021: 120 ML/MIN/1.73
ERYTHROCYTE [DISTWIDTH] IN BLOOD BY AUTOMATED COUNT: 12.8 % (ref 11.6–15.4)
GLOBULIN SER CALC-MCNC: 2.3 G/DL (ref 1.5–4.5)
GLUCOSE SERPL-MCNC: 152 MG/DL (ref 70–99)
HCT VFR BLD AUTO: 47.5 % (ref 37.5–51)
HGB BLD-MCNC: 15.9 G/DL (ref 13–17.7)
MCH RBC QN AUTO: 29.1 PG (ref 26.6–33)
MCHC RBC AUTO-ENTMCNC: 33.5 G/DL (ref 31.5–35.7)
MCV RBC AUTO: 87 FL (ref 79–97)
PLATELET # BLD AUTO: 504 X10E3/UL (ref 150–450)
POTASSIUM SERPL-SCNC: 4.6 MMOL/L (ref 3.5–5.2)
PROT SERPL-MCNC: 7 G/DL (ref 6–8.5)
RBC # BLD AUTO: 5.46 X10E6/UL (ref 4.14–5.8)
SODIUM SERPL-SCNC: 138 MMOL/L (ref 134–144)
WBC # BLD AUTO: 5.4 X10E3/UL (ref 3.4–10.8)

## 2024-01-30 ENCOUNTER — OFFICE VISIT (OUTPATIENT)
Dept: FAMILY MEDICINE CLINIC | Facility: CLINIC | Age: 34
End: 2024-01-30
Payer: COMMERCIAL

## 2024-01-30 VITALS
HEIGHT: 74 IN | SYSTOLIC BLOOD PRESSURE: 112 MMHG | HEART RATE: 74 BPM | TEMPERATURE: 97.7 F | WEIGHT: 250.4 LBS | RESPIRATION RATE: 14 BRPM | OXYGEN SATURATION: 98 % | BODY MASS INDEX: 32.14 KG/M2 | DIASTOLIC BLOOD PRESSURE: 82 MMHG

## 2024-01-30 DIAGNOSIS — E10.649 TYPE 1 DIABETES MELLITUS WITH HYPOGLYCEMIA AND WITHOUT COMA: Primary | ICD-10-CM

## 2024-01-30 NOTE — PROGRESS NOTES
Date: 2024   Patient Name: Kieran Canela  : 1990   MRN: [id number]     Chief Complaint:    Chief Complaint   Patient presents with    Follow-up     1 wk light headedness       History of Present Illness: Kieran Canela is a 33 y.o. male who is here today for Follow up on diabetes. Pt is a type 1 diabetic. He is following endocrinology. He recently suffered from the flu. He had a steroid injection and oral steroids and was very fatigued and overall not feeling well. He was having very high blood sugars. He is and was taking insulin as prescribed. He has been off work now for 1 week and needing a return to work note. He is feeling much better today and ready to go back to work.            Review of Systems:   Review of Systems   Constitutional:  Negative for fatigue.   Respiratory:  Negative for shortness of breath.    Cardiovascular:  Negative for chest pain.   Gastrointestinal:  Negative for abdominal pain.       Past Medical History:   Past Medical History:   Diagnosis Date    Anxiety     Diabetes mellitus        Past Surgical History: History reviewed. No pertinent surgical history.    Family History:   Family History   Problem Relation Age of Onset    Bipolar disorder Mother     Diabetes Mother     Hypertension Mother     Diabetes Father     Hypertension Father        Social History:   Social History     Socioeconomic History    Marital status:     Number of children: 2   Tobacco Use    Smoking status: Never    Smokeless tobacco: Never    Tobacco comments:     Vape   Vaping Use    Vaping Use: Every day    Substances: Nicotine   Substance and Sexual Activity    Alcohol use: Not Currently     Comment: 1/2 fifth q night    Drug use: Never       Medications:     Current Outpatient Medications:     albuterol sulfate  (90 Base) MCG/ACT inhaler, INHALE 2 PUFFS BY MOUTH EVERY 4 TO 6 HOURS AS NEEDED FOR COUGH AND FOR SHORTNESS OF BREATH AND FOR WHEEZING, Disp: , Rfl:     glucagon  "(GLUCAGEN) 1 MG injection, USE IN THE EVENT OF UNCONSCIOUS HYPOGLYCEMIA. INJECT IN THIGH OR BUTTOCKS MUSCLE, THEN CALL 911., Disp: , Rfl:     HumaLOG 100 UNIT/ML injection, INJECT UP  UNITS DAILY IN INSULIN PUMP, Disp: , Rfl:     Insulin Lispro (HUMALOG SC), Inject 30 Units under the skin into the appropriate area as directed Daily., Disp: , Rfl:     meclizine (ANTIVERT) 25 MG tablet, 1/2 to 1 to 2 pills as needed every 4 hours for dizziness, Disp: 40 tablet, Rfl: 1    meloxicam (MOBIC) 7.5 MG tablet, TAKE 1 TABLET BY MOUTH ONCE DAILY FOR PAIN, Disp: , Rfl:     mupirocin (BACTROBAN) 2 % ointment, APPLY OINTMENT TOPICALLY TO AFFECTED AREA TWICE DAILY FOR CELLULITIS FOR 3 WEEKS, Disp: , Rfl:     nystatin-triamcinolone (MYCOLOG II) 497340-5.1 UNIT/GM-% cream, Apply 1 application  topically to the appropriate area as directed 2 (Two) Times a Day., Disp: 60 g, Rfl: 0    propranolol (INDERAL) 10 MG tablet, Take 1 tablet by mouth 2 (Two) Times a Day As Needed (anxiety)., Disp: 60 tablet, Rfl: 0    sertraline (Zoloft) 50 MG tablet, Take 1.5 tablets by mouth Daily., Disp: 45 tablet, Rfl: 0    traZODone (DESYREL) 100 MG tablet, Take 1 tablet by mouth At Night As Needed for Sleep., Disp: 30 tablet, Rfl: 0    Allergies:   Allergies   Allergen Reactions    Citrullus Vulgaris Swelling         Physical Exam:  Vital Signs:   Vitals:    01/30/24 1217   BP: 112/82   Pulse: 74   Resp: 14   Temp: 97.7 °F (36.5 °C)   SpO2: 98%   Weight: 114 kg (250 lb 6.4 oz)   Height: 188 cm (74\")     Body mass index is 32.15 kg/m².     Physical Exam  Vitals and nursing note reviewed.   Constitutional:       Appearance: Normal appearance.   HENT:      Head: Normocephalic and atraumatic.   Cardiovascular:      Rate and Rhythm: Normal rate and regular rhythm.   Pulmonary:      Effort: Pulmonary effort is normal.      Breath sounds: Normal breath sounds.   Abdominal:      General: Bowel sounds are normal.   Skin:     General: Skin is warm. "   Neurological:      General: No focal deficit present.      Mental Status: He is alert and oriented to person, place, and time.   Psychiatric:         Mood and Affect: Mood normal.           Assessment/Plan:   Diagnoses and all orders for this visit:    1. Type 1 diabetes mellitus with hypoglycemia and without coma (Primary)       Diabetes Education  Goal A1C 7 or less  Check glucose at least 1x per day unless otherwise specified  Yearly eye exams  Check feet daily  Eat low carb diet, low sugar  Increase water intake  Exercise 30-45 mins, 3-4x a week  Take meds as prescribed        Follow Up:   Return if symptoms worsen or fail to improve, for Next scheduled follow up.    Ivon Martinez. MELODY   [facility]

## 2024-01-30 NOTE — LETTER
January 30, 2024     Patient: Kieran Canela   YOB: 1990   Date of Visit: 1/30/2024       To Whom It May Concern:    It is my medical opinion that Kieran Canela may return to work on 1/31/2024.         Sincerely,        MELODY Haile    CC: No Recipients

## 2024-02-13 ENCOUNTER — TELEMEDICINE (OUTPATIENT)
Dept: PSYCHIATRY | Facility: CLINIC | Age: 34
End: 2024-02-13
Payer: COMMERCIAL

## 2024-02-13 DIAGNOSIS — F33.1 MODERATE EPISODE OF RECURRENT MAJOR DEPRESSIVE DISORDER: Primary | ICD-10-CM

## 2024-02-13 DIAGNOSIS — Z79.899 MEDICATION MANAGEMENT: ICD-10-CM

## 2024-02-13 DIAGNOSIS — F41.1 GENERALIZED ANXIETY DISORDER: ICD-10-CM

## 2024-02-13 PROCEDURE — 99214 OFFICE O/P EST MOD 30 MIN: CPT

## 2024-02-13 RX ORDER — FLUOXETINE 10 MG/1
CAPSULE ORAL
Qty: 60 CAPSULE | Refills: 0 | Status: SHIPPED | OUTPATIENT
Start: 2024-02-13

## 2024-02-13 RX ORDER — SERTRALINE HYDROCHLORIDE 25 MG/1
TABLET, FILM COATED ORAL
Qty: 9 TABLET | Refills: 0 | Status: SHIPPED | OUTPATIENT
Start: 2024-02-13

## 2024-02-16 ENCOUNTER — TELEPHONE (OUTPATIENT)
Dept: PSYCHIATRY | Facility: CLINIC | Age: 34
End: 2024-02-16
Payer: COMMERCIAL

## 2024-02-16 NOTE — TELEPHONE ENCOUNTER
Pt called and needs the [facility] paper work filled out that has been scanned into their chart. Please advise

## 2024-02-21 NOTE — TELEPHONE ENCOUNTER
Paperwork completed and scanned into patients chart. Called patient and made them aware and sent it to them also.

## 2024-02-22 NOTE — TELEPHONE ENCOUNTER
Pt called back about the [facility] paper work and is wondering why the last page had not been filled out. He stated that was more of the important part to be filled out. The paper work is scanned into the patients chart. Please advise.

## 2024-02-26 ENCOUNTER — TELEPHONE (OUTPATIENT)
Dept: PSYCHIATRY | Facility: CLINIC | Age: 34
End: 2024-02-26
Payer: COMMERCIAL

## 2024-02-26 NOTE — TELEPHONE ENCOUNTER
Pt called back about FMLA paper work. Pt stated that on section 3 the start date would need to be 2/15/2024. Also he mentioned that the paper work does not need to say that he is not able to work over time. He doesn't need consecutive days off for FMLA he just needs it to be once every two weeks or 3 times a month or something along those lines. And it needs to be extended for at least 6 months. Can the paper work be filled out to say this for the patient? Please advise.

## 2024-03-07 ENCOUNTER — OFFICE VISIT (OUTPATIENT)
Dept: FAMILY MEDICINE CLINIC | Facility: CLINIC | Age: 34
End: 2024-03-07
Payer: COMMERCIAL

## 2024-03-07 ENCOUNTER — TELEMEDICINE (OUTPATIENT)
Dept: PSYCHIATRY | Facility: CLINIC | Age: 34
End: 2024-03-07
Payer: COMMERCIAL

## 2024-03-07 VITALS
HEIGHT: 74 IN | HEART RATE: 69 BPM | SYSTOLIC BLOOD PRESSURE: 132 MMHG | OXYGEN SATURATION: 99 % | RESPIRATION RATE: 16 BRPM | BODY MASS INDEX: 32.26 KG/M2 | WEIGHT: 251.4 LBS | DIASTOLIC BLOOD PRESSURE: 82 MMHG | TEMPERATURE: 98 F

## 2024-03-07 DIAGNOSIS — Z00.00 ENCOUNTER FOR WELL ADULT EXAM WITHOUT ABNORMAL FINDINGS: Primary | ICD-10-CM

## 2024-03-07 DIAGNOSIS — Z13.220 ENCOUNTER FOR LIPID SCREENING FOR CARDIOVASCULAR DISEASE: ICD-10-CM

## 2024-03-07 DIAGNOSIS — F33.1 MODERATE EPISODE OF RECURRENT MAJOR DEPRESSIVE DISORDER: Primary | ICD-10-CM

## 2024-03-07 DIAGNOSIS — K21.9 GASTROESOPHAGEAL REFLUX DISEASE, UNSPECIFIED WHETHER ESOPHAGITIS PRESENT: ICD-10-CM

## 2024-03-07 DIAGNOSIS — H61.23 BILATERAL IMPACTED CERUMEN: ICD-10-CM

## 2024-03-07 DIAGNOSIS — R42 DIZZINESS: ICD-10-CM

## 2024-03-07 DIAGNOSIS — H69.93 DYSFUNCTION OF BOTH EUSTACHIAN TUBES: ICD-10-CM

## 2024-03-07 DIAGNOSIS — Z79.899 MEDICATION MANAGEMENT: ICD-10-CM

## 2024-03-07 DIAGNOSIS — F41.1 GENERALIZED ANXIETY DISORDER: ICD-10-CM

## 2024-03-07 DIAGNOSIS — R06.81 WITNESSED APNEIC SPELLS: ICD-10-CM

## 2024-03-07 DIAGNOSIS — R06.83 SNORING: ICD-10-CM

## 2024-03-07 DIAGNOSIS — Z11.59 NEED FOR HEPATITIS C SCREENING TEST: ICD-10-CM

## 2024-03-07 DIAGNOSIS — G47.9 SLEEP DISTURBANCE: ICD-10-CM

## 2024-03-07 DIAGNOSIS — Z13.6 ENCOUNTER FOR LIPID SCREENING FOR CARDIOVASCULAR DISEASE: ICD-10-CM

## 2024-03-07 DIAGNOSIS — E66.9 OBESITY (BMI 30.0-34.9): ICD-10-CM

## 2024-03-07 PROCEDURE — 93000 ELECTROCARDIOGRAM COMPLETE: CPT | Performed by: PHYSICIAN ASSISTANT

## 2024-03-07 PROCEDURE — 99395 PREV VISIT EST AGE 18-39: CPT | Performed by: PHYSICIAN ASSISTANT

## 2024-03-07 RX ORDER — LORATADINE 10 MG/1
10 TABLET ORAL DAILY
COMMUNITY
Start: 2024-01-18 | End: 2024-03-07

## 2024-03-07 RX ORDER — TRAZODONE HYDROCHLORIDE 100 MG/1
100 TABLET ORAL NIGHTLY PRN
Qty: 30 TABLET | Refills: 1 | Status: SHIPPED | OUTPATIENT
Start: 2024-03-07

## 2024-03-07 RX ORDER — OMEPRAZOLE 20 MG/1
20 CAPSULE, DELAYED RELEASE ORAL DAILY
Qty: 90 CAPSULE | Refills: 0 | Status: SHIPPED | OUTPATIENT
Start: 2024-03-07

## 2024-03-07 RX ORDER — LORATADINE 10 MG/1
10 TABLET ORAL DAILY
Qty: 90 TABLET | Refills: 1 | Status: SHIPPED | OUTPATIENT
Start: 2024-03-07

## 2024-03-07 RX ORDER — FLUTICASONE PROPIONATE 50 MCG
2 SPRAY, SUSPENSION (ML) NASAL DAILY
Qty: 15.8 ML | Refills: 5 | Status: SHIPPED | OUTPATIENT
Start: 2024-03-07

## 2024-03-07 NOTE — PROGRESS NOTES
"Subjective   Kieran Canela is a 33 y.o. male.     Dizziness       Pt presents for annual exam     CC of ongoing dizziness/ light headedness X 2 months  Worse with position changes  Symptoms come and go   Notices when walking at times as well     Hx of vertigo last spring. Notes this does not feel as severe   Has tried meclizine without relief     Type one diabetic. Follows with endocrinology   Has insulin pump and continuous glucose monitor   Does have frequent hypoglycemic episodes- but notes this is mostly because of his diet and skipping meals at times due to busy schedule   Notes his job position will be changing soon and feels like this will be good for his blood sugar management     Witness apneic episodes from wife   Wife tells him he snores  Known deviated septum   No hx of sleep study     Ears have been feeling itchy      The following portions of the patient's history were reviewed and updated as appropriate: allergies, current medications, past family history, past medical history, past social history, past surgical history, and problem list.    Review of Systems   Neurological:  Positive for dizziness.   As noted per HPI     Objective   Blood pressure 132/82, pulse 69, temperature 98 °F (36.7 °C), resp. rate 16, height 188 cm (74\"), weight 114 kg (251 lb 6.4 oz), SpO2 99%.   Physical Exam  Vitals and nursing note reviewed.   Constitutional:       Appearance: Normal appearance. He is well-developed.   HENT:      Head: Normocephalic and atraumatic.      Right Ear: External ear normal. There is impacted cerumen.      Left Ear: External ear normal. There is impacted cerumen.      Nose: Nose normal.      Mouth/Throat:      Pharynx: No oropharyngeal exudate or posterior oropharyngeal erythema.      Comments: Narrow posterior pharynx   Eyes:      Conjunctiva/sclera: Conjunctivae normal.   Neck:      Thyroid: No thyromegaly.      Trachea: No tracheal deviation.   Cardiovascular:      Rate and Rhythm: Normal " rate and regular rhythm.      Heart sounds: Normal heart sounds.   Pulmonary:      Effort: Pulmonary effort is normal. No respiratory distress.      Breath sounds: Normal breath sounds. No wheezing or rales.   Chest:      Chest wall: No tenderness.   Musculoskeletal:      Cervical back: Normal range of motion and neck supple.   Lymphadenopathy:      Cervical: No cervical adenopathy.   Skin:     General: Skin is warm and dry.   Neurological:      Mental Status: He is alert and oriented to person, place, and time.   Psychiatric:         Mood and Affect: Mood normal.         Behavior: Behavior normal.         Thought Content: Thought content normal.         Judgment: Judgment normal.         Ear Cerumen Removal    Date/Time: 3/7/2024 2:41 PM    Performed by: Pranay Blum PA  Authorized by: Pranay Blum PA  Consent: Verbal consent obtained.  Risks and benefits: risks, benefits and alternatives were discussed  Consent given by: patient  Patient understanding: patient states understanding of the procedure being performed  Patient consent: the patient's understanding of the procedure matches consent given  Procedure consent: procedure consent matches procedure scheduled  Location: both ears.  Patient tolerance: Patient tolerated the procedure well with no immediate complications  Procedure type: irrigation   Sedation:  Patient sedated: no          ECG 12 Lead    Date/Time: 3/7/2024 2:42 PM  Performed by: Pranay Blum PA    Authorized by: Pranay Blum PA  Comparison: not compared with previous ECG   Previous ECG: no previous ECG available  Rhythm: sinus rhythm  Rate: normal  Conduction: conduction normal  ST Segments: ST segments normal  T Waves: T waves normal  QRS axis: normal    Clinical impression: normal ECG          Assessment & Plan   Diagnoses and all orders for this visit:    1. Encounter for well adult exam without abnormal findings (Primary)  -     Ear Cerumen Removal  -     ECG 12 Lead  -     CBC  w AUTO Differential  -     Comprehensive metabolic panel  -     Lipid Panel  -     Hemoglobin A1c  -     TSH  -     T4, free  -     Testosterone  -     Iron Profile  -     Vitamin B12  -     Folate  -     Magnesium  -     Hepatitis C antibody    2. Dizziness  -     ECG 12 Lead  -     CBC w AUTO Differential  -     Comprehensive metabolic panel  -     Hemoglobin A1c  -     TSH  -     T4, free  -     Testosterone  -     Iron Profile  -     Vitamin B12  -     Folate  -     Magnesium    3. Bilateral impacted cerumen  -     Ear Cerumen Removal    4. Encounter for lipid screening for cardiovascular disease  -     Lipid Panel    5. Need for hepatitis C screening test  -     Hepatitis C antibody    6. Snoring  -     Ambulatory Referral to Sleep Medicine    7. Obesity (BMI 30.0-34.9)  -     Ambulatory Referral to Sleep Medicine    8. Witnessed apneic spells  -     Ambulatory Referral to Sleep Medicine    9. Dysfunction of both eustachian tubes  -     fluticasone (FLONASE) 50 MCG/ACT nasal spray; 2 sprays into the nostril(s) as directed by provider Daily.  Dispense: 15.8 mL; Refill: 5  -     loratadine (Claritin) 10 MG tablet; Take 1 tablet by mouth Daily.  Dispense: 90 tablet; Refill: 1    10. Gastroesophageal reflux disease, unspecified whether esophagitis present  -     omeprazole (priLOSEC) 20 MG capsule; Take 1 capsule by mouth Daily.  Dispense: 90 capsule; Refill: 0    EKG normal. Pt aware   Ears cleaned   Treat ETD as noted to see if this helps with dizziness. Copy of [place]pley Maneuvers   Labs as noted.   Referral for sleep study placed     Counseling was given to patient for the following topics: instructions for management, risk factor reductions, patient and family education, and impressions . Total time of the encounter was 30 minutes and 15 minutes was spent counseling.
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
## 2024-03-14 ENCOUNTER — TELEPHONE (OUTPATIENT)
Dept: PSYCHIATRY | Facility: CLINIC | Age: 34
End: 2024-03-14
Payer: COMMERCIAL

## 2024-03-14 NOTE — TELEPHONE ENCOUNTER
Pt called and stated that their was a few things missing on [place] paper work, can you check and see what else should be filled out?

## 2024-04-01 ENCOUNTER — TELEPHONE (OUTPATIENT)
Dept: FAMILY MEDICINE CLINIC | Facility: CLINIC | Age: 34
End: 2024-04-01
Payer: COMMERCIAL

## 2024-04-01 NOTE — TELEPHONE ENCOUNTER
Unable to reach patient. Lvm to return call.   What is pt's BP running? Is he checking it at home?  Patient will need to wait for appointment. Historically his BP has been within normal range. Will have him bring in at home cuff that he's been using to monitor at home.

## 2024-04-01 NOTE — TELEPHONE ENCOUNTER
Pt calling asking if Dr. Perez would send in medication for high blood pressure. Informed patient he would need an appointment to be seen for it first, scheduled for this week. He still requested I send a message back to check.     Kieran [phone]

## 2024-04-02 ENCOUNTER — OFFICE VISIT (OUTPATIENT)
Dept: FAMILY MEDICINE CLINIC | Facility: CLINIC | Age: 34
End: 2024-04-02
Payer: COMMERCIAL

## 2024-04-02 VITALS
HEIGHT: 74 IN | BODY MASS INDEX: 31.95 KG/M2 | RESPIRATION RATE: 18 BRPM | SYSTOLIC BLOOD PRESSURE: 138 MMHG | HEART RATE: 78 BPM | WEIGHT: 249 LBS | DIASTOLIC BLOOD PRESSURE: 90 MMHG | OXYGEN SATURATION: 96 % | TEMPERATURE: 97.8 F

## 2024-04-02 DIAGNOSIS — I10 PRIMARY HYPERTENSION: Primary | ICD-10-CM

## 2024-04-02 DIAGNOSIS — R42 DIZZINESS: ICD-10-CM

## 2024-04-02 DIAGNOSIS — G47.19 DAYTIME HYPERSOMNOLENCE: ICD-10-CM

## 2024-04-02 DIAGNOSIS — R53.82 CHRONIC FATIGUE: ICD-10-CM

## 2024-04-02 DIAGNOSIS — E10.649 TYPE 1 DIABETES MELLITUS WITH HYPOGLYCEMIA AND WITHOUT COMA: ICD-10-CM

## 2024-04-02 PROCEDURE — 99214 OFFICE O/P EST MOD 30 MIN: CPT | Performed by: FAMILY MEDICINE

## 2024-04-02 RX ORDER — LISINOPRIL AND HYDROCHLOROTHIAZIDE 20; 12.5 MG/1; MG/1
1 TABLET ORAL DAILY
Qty: 30 TABLET | Refills: 2 | Status: SHIPPED | OUTPATIENT
Start: 2024-04-02

## 2024-04-02 RX ORDER — NYSTATIN 100000 U/G
1 CREAM TOPICAL 2 TIMES DAILY
Qty: 30 G | Refills: 1 | Status: SHIPPED | OUTPATIENT
Start: 2024-04-02

## 2024-04-02 NOTE — TELEPHONE ENCOUNTER
Spoke with patient. He is using an at home machine and got readings of 190/115 on 3/31 and 160/105 on 4/2. Patient states he has been feeling dizzy for weeks. Patient was informed its necessary to be seen first before he can be prescribed a BP medication. Pt going to switch appt from tomorrow to today.

## 2024-04-02 NOTE — PROGRESS NOTES
Subjective   Kieran Canela is a 33 y.o. male.     History of Present Illness     He has been having intermittent dizzy spells the past several weeks  He tried meclizine and epley without help    Phe has been monitoring his BP at home and it has een very high  190/105 when he checked it    These dizzy spells are VERY intermittent  Can go days without issue and then have a sudden attack out of nowhere  Almost feels like he is on a boat or he is unsteady  No heart palps noted  Can feel sweaty with this  They only last a few seconds    He is tired all the time as well  Hard to get out of bed  Then very tired when he is going home  He will fall asleep in a quiet place if nothing going on    Has blood work Pranay had ordered    He is a type 1 DM  Sugars are 200 range all the time      The following portions of the patient's history were reviewed and updated as appropriate: allergies, current medications, past family history, past medical history, past social history, past surgical history, and problem list.    Review of Systems   Constitutional: Negative.    Respiratory: Negative.     Psychiatric/Behavioral: Negative.         Objective   Physical Exam  Vitals and nursing note reviewed.   Constitutional:       General: He is not in acute distress.     Appearance: Normal appearance. He is well-developed.   Cardiovascular:      Rate and Rhythm: Normal rate and regular rhythm.      Heart sounds: Normal heart sounds.   Pulmonary:      Effort: Pulmonary effort is normal.      Breath sounds: Normal breath sounds.   Neurological:      Mental Status: He is alert and oriented to person, place, and time.   Psychiatric:         Mood and Affect: Mood normal.         Behavior: Behavior normal.         Thought Content: Thought content normal.         Judgment: Judgment normal.         Assessment & Plan   Diagnoses and all orders for this visit:    1. Primary hypertension (Primary)  -     lisinopril-hydrochlorothiazide (Zestoretic)  20-12.5 MG per tablet; Take 1 tablet by mouth Daily.  Dispense: 30 tablet; Refill: 2    2. Daytime hypersomnolence  -     Home Sleep Study; Future    3. Chronic fatigue  -     Home Sleep Study; Future    4. Dizziness    5. Type 1 diabetes mellitus with hypoglycemia and without coma    Other orders  -     nystatin (MYCOSTATIN) 055245 UNIT/GM cream; Apply 1 Application topically to the appropriate area as directed 2 (Two) Times a Day.  Dispense: 30 g; Refill: 1    BP Is high and has been high when not here.  Will start low dose prinzide which is also eneficial for his Hx of DM    Asked him to monitor glucose levels when he is dizzy as I am not sure why he continues to have episodic dizziness.    Will check home sleep study as I supsect he has some JOSH which would nee dtpo be treated.    Plan f/u in one month to recheck all issues

## 2024-04-03 LAB
ALBUMIN SERPL-MCNC: 4.7 G/DL (ref 4.1–5.1)
ALBUMIN/GLOB SERPL: 2.1 {RATIO} (ref 1.2–2.2)
ALP SERPL-CCNC: 119 IU/L (ref 44–121)
ALT SERPL-CCNC: 42 IU/L (ref 0–44)
AST SERPL-CCNC: 33 IU/L (ref 0–40)
BASOPHILS # BLD AUTO: 0.1 X10E3/UL (ref 0–0.2)
BASOPHILS NFR BLD AUTO: 2 %
BILIRUB SERPL-MCNC: 0.3 MG/DL (ref 0–1.2)
BUN SERPL-MCNC: 14 MG/DL (ref 6–20)
BUN/CREAT SERPL: 14 (ref 9–20)
CALCIUM SERPL-MCNC: 9.5 MG/DL (ref 8.7–10.2)
CHLORIDE SERPL-SCNC: 102 MMOL/L (ref 96–106)
CHOLEST SERPL-MCNC: 203 MG/DL (ref 100–199)
CO2 SERPL-SCNC: 23 MMOL/L (ref 20–29)
CREAT SERPL-MCNC: 0.97 MG/DL (ref 0.76–1.27)
EGFRCR SERPLBLD CKD-EPI 2021: 107 ML/MIN/1.73
EOSINOPHIL # BLD AUTO: 0.1 X10E3/UL (ref 0–0.4)
EOSINOPHIL NFR BLD AUTO: 3 %
ERYTHROCYTE [DISTWIDTH] IN BLOOD BY AUTOMATED COUNT: 12.4 % (ref 11.6–15.4)
FOLATE SERPL-MCNC: 8.6 NG/ML
GLOBULIN SER CALC-MCNC: 2.2 G/DL (ref 1.5–4.5)
GLUCOSE SERPL-MCNC: 110 MG/DL (ref 70–99)
HBA1C MFR BLD: 8.8 % (ref 4.8–5.6)
HCT VFR BLD AUTO: 46.2 % (ref 37.5–51)
HCV IGG SERPL QL IA: NON REACTIVE
HDLC SERPL-MCNC: 63 MG/DL
HGB BLD-MCNC: 15.4 G/DL (ref 13–17.7)
IMM GRANULOCYTES # BLD AUTO: 0 X10E3/UL (ref 0–0.1)
IMM GRANULOCYTES NFR BLD AUTO: 0 %
IRON SATN MFR SERPL: 18 % (ref 15–55)
IRON SERPL-MCNC: 63 UG/DL (ref 38–169)
LDLC SERPL CALC-MCNC: 128 MG/DL (ref 0–99)
LYMPHOCYTES # BLD AUTO: 1.3 X10E3/UL (ref 0.7–3.1)
LYMPHOCYTES NFR BLD AUTO: 45 %
MAGNESIUM SERPL-MCNC: 2.1 MG/DL (ref 1.6–2.3)
MCH RBC QN AUTO: 29.6 PG (ref 26.6–33)
MCHC RBC AUTO-ENTMCNC: 33.3 G/DL (ref 31.5–35.7)
MCV RBC AUTO: 89 FL (ref 79–97)
MONOCYTES # BLD AUTO: 0.5 X10E3/UL (ref 0.1–0.9)
MONOCYTES NFR BLD AUTO: 16 %
MORPHOLOGY BLD-IMP: ABNORMAL
NEUTROPHILS # BLD AUTO: 1 X10E3/UL (ref 1.4–7)
NEUTROPHILS NFR BLD AUTO: 34 %
PLATELET # BLD AUTO: 311 X10E3/UL (ref 150–450)
POTASSIUM SERPL-SCNC: 4.1 MMOL/L (ref 3.5–5.2)
PROT SERPL-MCNC: 6.9 G/DL (ref 6–8.5)
RBC # BLD AUTO: 5.21 X10E6/UL (ref 4.14–5.8)
SODIUM SERPL-SCNC: 139 MMOL/L (ref 134–144)
T4 FREE SERPL-MCNC: 1.16 NG/DL (ref 0.82–1.77)
TESTOST SERPL-MCNC: 404 NG/DL (ref 264–916)
TIBC SERPL-MCNC: 359 UG/DL (ref 250–450)
TRIGL SERPL-MCNC: 68 MG/DL (ref 0–149)
TSH SERPL DL<=0.005 MIU/L-ACNC: 3.48 UIU/ML (ref 0.45–4.5)
UIBC SERPL-MCNC: 296 UG/DL (ref 111–343)
VIT B12 SERPL-MCNC: 771 PG/ML (ref 232–1245)
VLDLC SERPL CALC-MCNC: 12 MG/DL (ref 5–40)
WBC # BLD AUTO: 2.9 X10E3/UL (ref 3.4–10.8)

## 2024-04-04 ENCOUNTER — TELEMEDICINE (OUTPATIENT)
Dept: PSYCHIATRY | Facility: CLINIC | Age: 34
End: 2024-04-04
Payer: COMMERCIAL

## 2024-04-04 DIAGNOSIS — F41.1 GENERALIZED ANXIETY DISORDER: Primary | ICD-10-CM

## 2024-04-04 DIAGNOSIS — D72.819 LEUKOPENIA, UNSPECIFIED TYPE: Primary | ICD-10-CM
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
## 2024-04-10 ENCOUNTER — TELEMEDICINE (OUTPATIENT)
Dept: PSYCHIATRY | Facility: CLINIC | Age: 34
End: 2024-04-10
Payer: COMMERCIAL

## 2024-04-10 DIAGNOSIS — F41.1 GENERALIZED ANXIETY DISORDER: Primary | ICD-10-CM

## 2024-04-10 DIAGNOSIS — Z79.899 MEDICATION MANAGEMENT: ICD-10-CM

## 2024-04-10 DIAGNOSIS — G47.9 SLEEP DISTURBANCE: ICD-10-CM

## 2024-04-10 RX ORDER — AMOXICILLIN AND CLAVULANATE POTASSIUM 875; 125 MG/1; MG/1
TABLET, FILM COATED ORAL
COMMUNITY
Start: 2024-04-09
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
## 2024-05-07 ENCOUNTER — OFFICE VISIT (OUTPATIENT)
Dept: FAMILY MEDICINE CLINIC | Facility: CLINIC | Age: 34
End: 2024-05-07
Payer: COMMERCIAL

## 2024-05-07 VITALS
TEMPERATURE: 97.8 F | SYSTOLIC BLOOD PRESSURE: 126 MMHG | OXYGEN SATURATION: 98 % | DIASTOLIC BLOOD PRESSURE: 70 MMHG | BODY MASS INDEX: 31.44 KG/M2 | HEIGHT: 74 IN | HEART RATE: 81 BPM | WEIGHT: 245 LBS | RESPIRATION RATE: 16 BRPM

## 2024-05-07 DIAGNOSIS — I10 PRIMARY HYPERTENSION: Primary | ICD-10-CM

## 2024-05-07 DIAGNOSIS — E10.649 TYPE 1 DIABETES MELLITUS WITH HYPOGLYCEMIA AND WITHOUT COMA: ICD-10-CM

## 2024-05-07 DIAGNOSIS — R05.3 CHRONIC COUGH: ICD-10-CM

## 2024-05-07 DIAGNOSIS — L30.9 ECZEMA, UNSPECIFIED TYPE: ICD-10-CM

## 2024-05-07 DIAGNOSIS — R42 LIGHT HEADED: ICD-10-CM

## 2024-05-07 DIAGNOSIS — D72.819 LEUKOPENIA, UNSPECIFIED TYPE: ICD-10-CM

## 2024-05-07 PROCEDURE — 99214 OFFICE O/P EST MOD 30 MIN: CPT | Performed by: FAMILY MEDICINE

## 2024-05-07 RX ORDER — LISINOPRIL AND HYDROCHLOROTHIAZIDE 20; 12.5 MG/1; MG/1
1 TABLET ORAL DAILY
Qty: 90 TABLET | Refills: 1 | Status: SHIPPED | OUTPATIENT
Start: 2024-05-07

## 2024-05-07 RX ORDER — LORATADINE 10 MG/1
10 TABLET ORAL DAILY
Qty: 30 TABLET | Refills: 2 | Status: SHIPPED | OUTPATIENT
Start: 2024-05-07

## 2024-05-07 RX ORDER — TRIAMCINOLONE ACETONIDE 5 MG/G
1 CREAM TOPICAL 2 TIMES DAILY
Qty: 30 G | Refills: 0 | Status: SHIPPED | OUTPATIENT
Start: 2024-05-07

## 2024-05-09 LAB
BASOPHILS # BLD AUTO: 0.1 X10E3/UL (ref 0–0.2)
BASOPHILS NFR BLD AUTO: 1 %
EOSINOPHIL # BLD AUTO: 0.4 X10E3/UL (ref 0–0.4)
EOSINOPHIL NFR BLD AUTO: 7 %
ERYTHROCYTE [DISTWIDTH] IN BLOOD BY AUTOMATED COUNT: 12.1 % (ref 11.6–15.4)
HCT VFR BLD AUTO: 43.6 % (ref 37.5–51)
HGB BLD-MCNC: 14.5 G/DL (ref 13–17.7)
IMM GRANULOCYTES # BLD AUTO: 0 X10E3/UL (ref 0–0.1)
IMM GRANULOCYTES NFR BLD AUTO: 0 %
LYMPHOCYTES # BLD AUTO: 1.6 X10E3/UL (ref 0.7–3.1)
LYMPHOCYTES NFR BLD AUTO: 32 %
MCH RBC QN AUTO: 29.6 PG (ref 26.6–33)
MCHC RBC AUTO-ENTMCNC: 33.3 G/DL (ref 31.5–35.7)
MCV RBC AUTO: 89 FL (ref 79–97)
MONOCYTES # BLD AUTO: 0.6 X10E3/UL (ref 0.1–0.9)
MONOCYTES NFR BLD AUTO: 11 %
NEUTROPHILS # BLD AUTO: 2.4 X10E3/UL (ref 1.4–7)
NEUTROPHILS NFR BLD AUTO: 49 %
PATH INTERP BLD-IMP: NORMAL
PATH REV BLD -IMP: NORMAL
PATHOLOGIST NAME: NORMAL
PLATELET # BLD AUTO: 338 X10E3/UL (ref 150–450)
RBC # BLD AUTO: 4.9 X10E6/UL (ref 4.14–5.8)
WBC # BLD AUTO: 5 X10E3/UL (ref 3.4–10.8)

## 2024-05-13 ENCOUNTER — TELEMEDICINE (OUTPATIENT)
Dept: PSYCHIATRY | Facility: CLINIC | Age: 34
End: 2024-05-13
Payer: COMMERCIAL

## 2024-05-13 DIAGNOSIS — F41.1 GENERALIZED ANXIETY DISORDER: Primary | ICD-10-CM

## 2024-05-13 PROCEDURE — 90837 PSYTX W PT 60 MINUTES: CPT | Performed by: COUNSELOR

## 2024-05-14 ENCOUNTER — TELEMEDICINE (OUTPATIENT)
Dept: PSYCHIATRY | Facility: CLINIC | Age: 34
End: 2024-05-14
Payer: COMMERCIAL

## 2024-05-14 DIAGNOSIS — F41.1 GENERALIZED ANXIETY DISORDER: Primary | ICD-10-CM

## 2024-05-14 DIAGNOSIS — Z79.899 MEDICATION MANAGEMENT: ICD-10-CM

## 2024-05-14 DIAGNOSIS — G47.9 SLEEP DISTURBANCE: ICD-10-CM

## 2024-05-14 PROCEDURE — 99214 OFFICE O/P EST MOD 30 MIN: CPT

## 2024-05-14 RX ORDER — TRAZODONE HYDROCHLORIDE 100 MG/1
100 TABLET ORAL NIGHTLY PRN
Qty: 30 TABLET | Refills: 0 | Status: SHIPPED | OUTPATIENT
Start: 2024-05-14
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
## 2024-05-14 NOTE — PROGRESS NOTES
Date: May 14, 2024  Time In: 1500  Time Out: 1559  This provider is located at home address for [facility] (through [facility]), [address], [place] [address] using a secure [employer] Video Visit through [employer]. Patient is being seen remotely via telehealth at home address in [place] and stated they are in a secure environment for this session. The patient's condition being diagnosed/treated is appropriate for telemedicine. The provider identified herself as well as her credentials. The patient, and/or patients guardian, consent to be seen remotely, and when consent is given they understand that the consent allows for patient identifiable information to be sent to a third party as needed. They may refuse to be seen remotely at any time. The electronic data is encrypted and password protected, and the patient and/or guardian has been advised of the potential risks to privacy not withstanding such measures.     You have chosen to receive care through a telehealth visit.  Do you consent to use a video/audio connection for your medical care today? Yes    PROGRESS NOTE  Data:  Kieran Canela is a 33 y.o. male who presents today for follow up    Chief Complaint: anxiety     History of Present Illness: Pt reports life updates since previous session. Pt identified current stressors. Pt acknowledged stressors within and outside of one's control. Pt discussed current coping method for coping with stressors outside of one's control to change or address. Pt also reviewed current interpersonal relationships, work tasks, and home life. Pt reports that he does find his mind racing at times and voiced that once he has a better rapport built he would like to process these thoughts further.         Clinical Maneuvering/Intervention:    (Scales based on 0 - 10 with 10 being the worst)  Depression: 0 Anxiety: 3       Assisted patient in processing above session content;  acknowledged and normalized patient’s thoughts, feelings, and concerns.  Rationalized patient thought process regarding recent stressors and life events. Discussed triggers associated with patient's emotions. Also discussed coping skills for patient to implement.     Allowed patient to freely discuss issues without interruption or judgment. Provided safe, confidential environment to facilitate the development of positive therapeutic relationship and encourage open, honest communication. Assisted patient in identifying risk factors which would indicate the need for higher level of care including thoughts to harm self or others and/or self-harming behavior and encouraged patient to contact this office, call 911, or present to the nearest emergency room should any of these events occur. Discussed crisis intervention services and means to access. Patient adamantly and convincingly denies current suicidal or homicidal ideation or perceptual disturbance.    Assessment:   Assessment   Patient appears to maintain relative stability as compared to their baseline.  However, patient continues to struggle with anxiety which continues to cause impairment in important areas of functioning.  A result, they can be reasonably expected to continue to benefit from treatment and would likely be at increased risk for decompensation otherwise.    Mental Status Exam:   Hygiene:   good  Cooperation:  Cooperative  Eye Contact:  Good  Psychomotor Behavior:  Appropriate  Affect:  Appropriate  Mood: anxious  Speech:  Normal  Thought Process:  Linear  Thought Content:  Mood congruent  Suicidal:  None  Homicidal:  None  Hallucinations:  None  Delusion:  None  Memory:  Intact  Orientation:  Person, Place, Time and Situation  Reliability:  fair  Insight:  Fair  Judgement:  Fair  Impulse Control:  Fair  Physical/Medical Issues:  No        Patient's Support Network Includes:  wife    Functional Status: Mild impairment     Progress toward goal: Not  at goal    Prognosis: Fair with Ongoing Treatment            Plan:    Patient will continue in individual outpatient therapy with focus on improved functioning and coping skills, maintaining stability, and avoiding decompensation and the need for higher level of care.    Patient will adhere to medication regimen as prescribed and report any side effects. Patient will contact this office, call 911 or present to the nearest emergency room should suicidal or homicidal ideations occur. Provide Cognitive Behavioral Therapy and Solution Focused Therapy to improve functioning, maintain stability, and avoid decompensation and the need for higher level of care.     Return in about 4 weeks, or earlier if symptoms worsen or fail to improve.           VISIT DIAGNOSIS:     ICD-10-CM ICD-9-CM   1. Generalized anxiety disorder  F41.1 300.02        Diagnoses and all orders for this visit:    1. Generalized anxiety disorder (Primary)           [facility] No Show Policy:  We understand unexpected circumstances arise; however, anytime you miss your appointment we are unable to provide you appropriate care.  In addition, each appointment missed could have been used to provide care for others.  We ask that you call at least 24 hours in advance to cancel or reschedule an appointment.  We would like to take this opportunity to remind you of our policy stating patients who miss THREE or more appointments without cancelling or rescheduling 24 hours in advance of the appointment may be subject to cancellation of any further visits with our clinic and recommendation to seek in-person services/visits.    Please call [phone] to reschedule your appointment. If there are reasons that make it difficult for you to keep the appointments, please call and let us know how we can help.  Please understand that medication prescribing will not continue without seeing your provider.      [facility]'s No  Show Policy reviewed with patient at today's visit. Patient verbalized understanding of this policy. Discussed with patient that in the event that there are three or more no show visits, it will be recommended that they pursue in-person services/visits as noncompliance with telehealth visits indicates that patient is not an appropriate candidate for telemedicine and would likely be more appropriate for in-person services/visits. Patient verbalizes understanding and is agreeable to this.        This document has been electronically signed by Sherine Rooney LCSW.  May 14, 2024 08:44 EDT      Part of this note may be an electronic transcription/translation of spoken language to printed text using the Dragon Dictation System.

## 2024-06-11 ENCOUNTER — TELEMEDICINE (OUTPATIENT)
Dept: PSYCHIATRY | Facility: CLINIC | Age: 34
End: 2024-06-11
Payer: COMMERCIAL

## 2024-06-11 DIAGNOSIS — Z79.899 MEDICATION MANAGEMENT: ICD-10-CM

## 2024-06-11 DIAGNOSIS — G47.9 SLEEP DISTURBANCE: Primary | ICD-10-CM

## 2024-06-11 DIAGNOSIS — F41.1 GENERALIZED ANXIETY DISORDER: ICD-10-CM

## 2024-06-11 PROCEDURE — 99214 OFFICE O/P EST MOD 30 MIN: CPT

## 2024-06-11 RX ORDER — TRAZODONE HYDROCHLORIDE 100 MG/1
100-150 TABLET ORAL NIGHTLY PRN
Qty: 45 TABLET | Refills: 0 | Status: SHIPPED | OUTPATIENT
Start: 2024-06-11
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
## 2024-07-03 ENCOUNTER — HOSPITAL ENCOUNTER (OUTPATIENT)
Dept: GENERAL RADIOLOGY | Facility: HOSPITAL | Age: 34
Discharge: HOME | End: 2024-07-03
Admitting: FAMILY MEDICINE
Payer: COMMERCIAL

## 2024-07-03 ENCOUNTER — OFFICE VISIT (OUTPATIENT)
Dept: FAMILY MEDICINE CLINIC | Facility: CLINIC | Age: 34
End: 2024-07-03
Payer: COMMERCIAL

## 2024-07-03 VITALS
HEART RATE: 66 BPM | TEMPERATURE: 98.8 F | WEIGHT: 256 LBS | OXYGEN SATURATION: 97 % | BODY MASS INDEX: 32.85 KG/M2 | SYSTOLIC BLOOD PRESSURE: 124 MMHG | HEIGHT: 74 IN | RESPIRATION RATE: 16 BRPM | DIASTOLIC BLOOD PRESSURE: 80 MMHG

## 2024-07-03 DIAGNOSIS — R05.3 CHRONIC COUGH: Primary | ICD-10-CM

## 2024-07-03 DIAGNOSIS — R14.0 ABDOMINAL BLOATING: ICD-10-CM

## 2024-07-03 DIAGNOSIS — R05.3 CHRONIC COUGH: ICD-10-CM

## 2024-07-03 DIAGNOSIS — Z87.891 PERSONAL HISTORY OF TOBACCO USE, PRESENTING HAZARDS TO HEALTH: ICD-10-CM

## 2024-07-03 PROBLEM — Z46.81 INSULIN PUMP TITRATION: Status: ACTIVE | Noted: 2024-05-23

## 2024-07-03 PROBLEM — E10.65 TYPE 1 DIABETES MELLITUS WITH HYPERGLYCEMIA: Status: ACTIVE | Noted: 2024-02-20

## 2024-07-03 PROCEDURE — 71046 X-RAY EXAM CHEST 2 VIEWS: CPT

## 2024-07-03 PROCEDURE — 99214 OFFICE O/P EST MOD 30 MIN: CPT | Performed by: FAMILY MEDICINE

## 2024-07-03 RX ORDER — VARENICLINE TARTRATE 1 MG/1
1 TABLET, FILM COATED ORAL 2 TIMES DAILY
Qty: 56 TABLET | Refills: 1 | Status: SHIPPED | OUTPATIENT
Start: 2024-07-31 | End: 2024-09-25

## 2024-07-03 RX ORDER — VARENICLINE TARTRATE 0.5 (11)-1
KIT ORAL
Qty: 1 EACH | Refills: 0 | Status: SHIPPED | OUTPATIENT
Start: 2024-07-03 | End: 2024-07-31

## 2024-07-03 NOTE — PROGRESS NOTES
Subjective   Kieran Canela is a 33 y.o. male.     Cough         He has had a cough for the past 2 months  Just irritation in his throat  Dry cough  This is coming and going  Worse with chewing gum or eating  He has seen ENT  He could not do the sleep study  Claritin did not help and prilosec still being used without any change in his cough      He feels bad, just like he is hung over all the time    He is very tired in general  He could not do the sleep study as the apparatus irritated him      He has noted that his food is not digesting well  He is bloated after he eats  Wants to see GI      The following portions of the patient's history were reviewed and updated as appropriate: allergies, current medications, past family history, past medical history, past social history, past surgical history, and problem list.    Review of Systems   Constitutional: Negative.    Respiratory:  Positive for cough.    Psychiatric/Behavioral: Negative.         Objective   Physical Exam  Vitals and nursing note reviewed.   Constitutional:       General: He is not in acute distress.     Appearance: Normal appearance. He is well-developed.   Eyes:      Extraocular Movements: Extraocular movements intact.      Conjunctiva/sclera: Conjunctivae normal.   Cardiovascular:      Rate and Rhythm: Normal rate and regular rhythm.      Heart sounds: Normal heart sounds.   Pulmonary:      Effort: Pulmonary effort is normal.      Breath sounds: Normal breath sounds.   Neurological:      Mental Status: He is alert and oriented to person, place, and time.   Psychiatric:         Mood and Affect: Mood normal.         Behavior: Behavior normal.         Thought Content: Thought content normal.         Judgment: Judgment normal.         Assessment & Plan   Diagnoses and all orders for this visit:    1. Chronic cough (Primary)  -     XR Chest PA & Lateral; Future  -     Complete PFT - Pre & Post Bronchodilator; Future    2. Abdominal bloating  -      Ambulatory Referral to Gastroenterology  -     NM gastric emptying; Future    3. Personal history of tobacco use, presenting hazards to health  -     Varenicline Tartrate, Starter, 0.5 MG X 11 & 1 MG X 42 tablet therapy pack; Take 0.5 mg by mouth Daily for 3 days, THEN 0.5 mg 2 (Two) Times a Day for 4 days, THEN 1 mg 2 (Two) Times a Day for 21 days. Take 0.5 mg po daily x 3 days, then 0.5 mg po bid x 4 days, then 1 mg po bid  Dispense: 1 each; Refill: 0  -     varenicline (Chantix Continuing Month Masoud) 1 MG tablet; Take 1 tablet by mouth 2 (Two) Times a Day for 56 days.  Dispense: 56 tablet; Refill: 1    I am not sure where the cough is coming from.  We have tried antihistamine for PND as well as prilosec for silent reflux without improvement.  We are going to stop lisinopril and call back in 2 weeks  Check PFTs and CXR to look for other cuases  He has seen ENT without cause noted for cough.  Will call him once studies are back    Ok GI referral as well as gastric emptying study, he may have gastroparesis based on his complaints

## 2024-07-22 ENCOUNTER — HOSPITAL ENCOUNTER (OUTPATIENT)
Dept: NUCLEAR MEDICINE | Facility: HOSPITAL | Age: 34
Discharge: HOME | End: 2024-07-22
Payer: COMMERCIAL

## 2024-07-22 ENCOUNTER — HOSPITAL ENCOUNTER (OUTPATIENT)
Dept: PULMONOLOGY | Facility: HOSPITAL | Age: 34
Discharge: HOME | End: 2024-07-22
Admitting: FAMILY MEDICINE
Payer: COMMERCIAL

## 2024-07-22 DIAGNOSIS — R05.3 CHRONIC COUGH: ICD-10-CM

## 2024-07-22 DIAGNOSIS — R14.0 ABDOMINAL BLOATING: ICD-10-CM

## 2024-07-22 PROCEDURE — 78264 GASTRIC EMPTYING IMG STUDY: CPT

## 2024-07-22 PROCEDURE — 94010 BREATHING CAPACITY TEST: CPT

## 2024-07-22 PROCEDURE — 94729 DIFFUSING CAPACITY: CPT

## 2024-07-22 PROCEDURE — 94726 PLETHYSMOGRAPHY LUNG VOLUMES: CPT

## 2024-07-22 PROCEDURE — A9541 TC99M SULFUR COLLOID: HCPCS | Performed by: FAMILY MEDICINE

## 2024-07-22 PROCEDURE — 0 TECHNETIUM SULFUR COLLOID: Performed by: FAMILY MEDICINE

## 2024-07-22 RX ADMIN — TECHNETIUM TC 99M SULFUR COLLOID 1 DOSE: KIT at 09:00

## 2024-07-23 PROCEDURE — 94726 PLETHYSMOGRAPHY LUNG VOLUMES: CPT | Performed by: INTERNAL MEDICINE

## 2024-07-23 PROCEDURE — 94729 DIFFUSING CAPACITY: CPT | Performed by: INTERNAL MEDICINE

## 2024-07-23 PROCEDURE — 94010 BREATHING CAPACITY TEST: CPT | Performed by: INTERNAL MEDICINE

## 2024-08-08 ENCOUNTER — TELEMEDICINE (OUTPATIENT)
Dept: PSYCHIATRY | Facility: CLINIC | Age: 34
End: 2024-08-08
Payer: COMMERCIAL

## 2024-08-08 DIAGNOSIS — F41.1 GENERALIZED ANXIETY DISORDER: Primary | ICD-10-CM

## 2024-08-08 DIAGNOSIS — G47.9 SLEEP DISTURBANCE: ICD-10-CM

## 2024-08-08 DIAGNOSIS — Z79.899 MEDICATION MANAGEMENT: ICD-10-CM

## 2024-08-08 RX ORDER — TRAZODONE HYDROCHLORIDE 100 MG/1
100-150 TABLET ORAL NIGHTLY PRN
Qty: 45 TABLET | Refills: 0 | Status: SHIPPED | OUTPATIENT
Start: 2024-08-08 | End: 2024-08-08

## 2024-08-08 RX ORDER — PROPRANOLOL HYDROCHLORIDE 10 MG/1
10 TABLET ORAL 2 TIMES DAILY PRN
Qty: 60 TABLET | Refills: 0 | Status: SHIPPED | OUTPATIENT
Start: 2024-08-08

## 2024-08-08 RX ORDER — TRAZODONE HYDROCHLORIDE 100 MG/1
100-150 TABLET ORAL NIGHTLY PRN
Qty: 45 TABLET | Refills: 1 | Status: SHIPPED | OUTPATIENT
Start: 2024-08-08
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
## 2024-09-09 ENCOUNTER — OFFICE VISIT (OUTPATIENT)
Dept: FAMILY MEDICINE CLINIC | Facility: CLINIC | Age: 34
End: 2024-09-09
Payer: COMMERCIAL

## 2024-09-09 VITALS
BODY MASS INDEX: 32.85 KG/M2 | SYSTOLIC BLOOD PRESSURE: 148 MMHG | HEART RATE: 85 BPM | DIASTOLIC BLOOD PRESSURE: 70 MMHG | RESPIRATION RATE: 16 BRPM | WEIGHT: 256 LBS | TEMPERATURE: 97.8 F | HEIGHT: 74 IN

## 2024-09-09 DIAGNOSIS — E10.65 TYPE 1 DIABETES MELLITUS WITH HYPERGLYCEMIA: ICD-10-CM

## 2024-09-09 DIAGNOSIS — J32.0 CHRONIC MAXILLARY SINUSITIS: Primary | ICD-10-CM

## 2024-09-09 DIAGNOSIS — M54.42 ACUTE MIDLINE LOW BACK PAIN WITH LEFT-SIDED SCIATICA: ICD-10-CM

## 2024-09-09 PROCEDURE — 99214 OFFICE O/P EST MOD 30 MIN: CPT | Performed by: FAMILY MEDICINE

## 2024-09-09 RX ORDER — PREDNISONE 20 MG/1
40 TABLET ORAL DAILY
Qty: 8 TABLET | Refills: 0 | Status: SHIPPED | OUTPATIENT
Start: 2024-09-09

## 2024-09-09 RX ORDER — SEMAGLUTIDE 1.34 MG/ML
INJECTION, SOLUTION SUBCUTANEOUS
Qty: 3 ML | Refills: 1 | Status: SHIPPED | OUTPATIENT
Start: 2024-09-09

## 2024-09-09 NOTE — PROGRESS NOTES
Subjective   Kieran Canela is a 34 y.o. male.     Hypertension    Diabetes       He has been having some intermittent pressure in his head  Top and back of head  Foul smell in nose as well  Pressure and aching pain        When he sits in certain position both legs go numb  Worse at home when he has been laying down  Laying in bed  Hx bulging disc in the past  He just has to reposition himself      His sugars have been much higher than in past  Having fluctuations up all the time  Is on insulin pump  Would like  endocrinology      The following portions of the patient's history were reviewed and updated as appropriate: allergies, current medications, past family history, past medical history, past social history, past surgical history, and problem list.    Review of Systems   Constitutional: Negative.    Musculoskeletal:  Positive for back pain.       Objective   Physical Exam  Vitals and nursing note reviewed.   Constitutional:       General: He is not in acute distress.     Appearance: Normal appearance. He is well-developed.   Cardiovascular:      Rate and Rhythm: Normal rate and regular rhythm.      Heart sounds: Normal heart sounds.   Pulmonary:      Effort: Pulmonary effort is normal.      Breath sounds: Normal breath sounds.   Neurological:      Mental Status: He is alert and oriented to person, place, and time.   Psychiatric:         Mood and Affect: Mood normal.         Behavior: Behavior normal.         Thought Content: Thought content normal.         Judgment: Judgment normal.       Assessment & Plan   Diagnoses and all orders for this visit:    1. Chronic maxillary sinusitis (Primary)  -     predniSONE (DELTASONE) 20 MG tablet; Take 2 tablets by mouth Daily.  Dispense: 8 tablet; Refill: 0  -     amoxicillin-clavulanate (AUGMENTIN) 875-125 MG per tablet; Take 1 tablet by mouth 2 (Two) Times a Day.  Dispense: 20 tablet; Refill: 0    2. Acute midline low back pain with left-sided sciatica  -     XR  Spine Lumbar 4+ View; Future    3. Type 1 diabetes mellitus with hyperglycemia  -     Ambulatory Referral to Endocrinology  -     Semaglutide,0.25 or 0.5MG/DOS, (Ozempic, 0.25 or 0.5 MG/DOSE,) 2 MG/1.5ML solution pen-injector; 0.25mg SQ weekly  Dispense: 3 mL; Refill: 1  -     CBC & Differential  -     Comprehensive Metabolic Panel  -     Hemoglobin A1c      Short steroid burst and Abx for sinus infection.  Consider ENT for eval INB.  Pt agrees to call back INB    XR lumbar spine and will f/u pending imagines of this worsening, chronic pain    Will work on endocrin referral and use ozempic low dose in conjunction with insulin

## 2024-09-11 ENCOUNTER — TELEPHONE (OUTPATIENT)
Dept: FAMILY MEDICINE CLINIC | Facility: CLINIC | Age: 34
End: 2024-09-11
Payer: COMMERCIAL

## 2024-09-19 ENCOUNTER — TELEMEDICINE (OUTPATIENT)
Dept: PSYCHIATRY | Facility: CLINIC | Age: 34
End: 2024-09-19
Payer: COMMERCIAL

## 2024-09-19 DIAGNOSIS — F41.1 GENERALIZED ANXIETY DISORDER: Primary | ICD-10-CM

## 2024-10-03 ENCOUNTER — TELEMEDICINE (OUTPATIENT)
Dept: PSYCHIATRY | Facility: CLINIC | Age: 34
End: 2024-10-03
Payer: COMMERCIAL

## 2024-10-03 DIAGNOSIS — Z79.899 MEDICATION MANAGEMENT: ICD-10-CM

## 2024-10-03 DIAGNOSIS — G47.9 SLEEP DISTURBANCE: ICD-10-CM

## 2024-10-03 DIAGNOSIS — F41.1 GENERALIZED ANXIETY DISORDER: Primary | ICD-10-CM

## 2024-10-03 RX ORDER — TRAZODONE HYDROCHLORIDE 100 MG/1
100-150 TABLET ORAL NIGHTLY PRN
Qty: 45 TABLET | Refills: 2 | Status: SHIPPED | OUTPATIENT
Start: 2024-10-03
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
## 2024-10-03 NOTE — TREATMENT PLAN
Multi-Disciplinary Problems (from Behavioral Health Treatment Plan)      Active Problems       Problem: Anxiety  Start Date: 10/03/24      Problem Details: The patient self-scales this problem as a 3 with 10 being the worst.          Goal Priority Start Date Expected End Date End Date    Patient will develop and implement behavioral and cognitive strategies to reduce anxiety and irrational fears. Medium 10/03/24 04/03/25 --    Goal Details: Progress toward goal:  The patient self-scales their progress related to this goal as a 3 with 10 being the worst.          Goal Intervention Frequency Start Date End Date    Help patient explore past emotional issues in relation to present anxiety. PRN 10/03/24 --    Intervention Details: Duration of treatment until discharged.          Goal Intervention Frequency Start Date End Date    Help patient develop an awareness of their cognitive and physical responses to anxiety. PRN 10/03/24 --    Intervention Details: Duration of treatment until discharged.                                 I have discussed and reviewed this treatment plan with the patient and/or guardian.  The patient has verbally agreed with this treatment plan (no signatures are obtained at today's visit as the patient is a telehealth patient and is unable to print and sign this document, therefore verbal agreement is obtained).

## 2024-10-24 ENCOUNTER — HOSPITAL ENCOUNTER (OUTPATIENT)
Age: 34
Discharge: HOME | End: 2024-10-24
Payer: COMMERCIAL

## 2024-10-24 VITALS
SYSTOLIC BLOOD PRESSURE: 136 MMHG | HEART RATE: 83 BPM | TEMPERATURE: 98.06 F | RESPIRATION RATE: 16 BRPM | OXYGEN SATURATION: 97 % | DIASTOLIC BLOOD PRESSURE: 86 MMHG

## 2024-10-24 VITALS
OXYGEN SATURATION: 96 % | HEART RATE: 102 BPM | RESPIRATION RATE: 18 BRPM | SYSTOLIC BLOOD PRESSURE: 127 MMHG | DIASTOLIC BLOOD PRESSURE: 82 MMHG

## 2024-10-24 VITALS
SYSTOLIC BLOOD PRESSURE: 135 MMHG | DIASTOLIC BLOOD PRESSURE: 88 MMHG | RESPIRATION RATE: 16 BRPM | HEART RATE: 92 BPM | OXYGEN SATURATION: 96 %

## 2024-10-24 VITALS
RESPIRATION RATE: 16 BRPM | DIASTOLIC BLOOD PRESSURE: 74 MMHG | SYSTOLIC BLOOD PRESSURE: 132 MMHG | HEART RATE: 96 BPM | OXYGEN SATURATION: 95 %

## 2024-10-24 VITALS — OXYGEN SATURATION: 97 %

## 2024-10-24 VITALS
RESPIRATION RATE: 16 BRPM | SYSTOLIC BLOOD PRESSURE: 123 MMHG | DIASTOLIC BLOOD PRESSURE: 74 MMHG | HEART RATE: 111 BPM | OXYGEN SATURATION: 91 %

## 2024-10-24 VITALS — BODY MASS INDEX: 31.4 KG/M2

## 2024-10-24 DIAGNOSIS — R14.0: ICD-10-CM

## 2024-10-24 DIAGNOSIS — R11.0: ICD-10-CM

## 2024-10-24 DIAGNOSIS — R68.81: ICD-10-CM

## 2024-10-24 DIAGNOSIS — K21.9: ICD-10-CM

## 2024-10-24 DIAGNOSIS — R09.A2: ICD-10-CM

## 2024-10-24 DIAGNOSIS — K44.9: ICD-10-CM

## 2024-10-24 DIAGNOSIS — R15.0: ICD-10-CM

## 2024-10-24 DIAGNOSIS — R13.10: ICD-10-CM

## 2024-10-24 DIAGNOSIS — R10.12: Primary | ICD-10-CM

## 2024-10-24 DIAGNOSIS — K20.0: ICD-10-CM

## 2024-10-24 LAB — GLUCOSE BLDC GLUCOMTR-MCNC: 369 MG/DL (ref 70–110)

## 2024-10-24 PROCEDURE — C1726 CATH, BAL DIL, NON-VASCULAR: HCPCS

## 2024-10-24 PROCEDURE — 82962 GLUCOSE BLOOD TEST: CPT

## 2024-10-24 PROCEDURE — 43249 ESOPH EGD DILATION <30 MM: CPT

## 2024-10-24 PROCEDURE — 43239 EGD BIOPSY SINGLE/MULTIPLE: CPT

## 2024-10-24 PROCEDURE — 0DJ08ZZ INSPECTION OF UPPER INTESTINAL TRACT, VIA NATURAL OR ARTIFICIAL OPENING ENDOSCOPIC: ICD-10-PCS | Performed by: INTERNAL MEDICINE

## 2024-10-24 NOTE — EXP.HP
History of Present Illness
*Admission Date: 10/24/24
*Reason for visit:: Dyspepsia
*History of present illness: 
Mr. Brown is a 34-year-old gentleman who is here for left upper quadrant abdominal pain, bloating, reflux and dysphagia.  He has nausea and early satiety..  The examination is deemed medically necessary for EGD.  The patient has been seen, 
interviewed and examined prior to the procedure by both myself and the anesthesia provider.

[facility]
Disclaimer: 
The information contained in this section may have been updated after the patient was seen, as this information can be updated by other users.

Medical History (Reviewed 09/30/24 @ 15:08 by Yuli Borjas MA)

Diabetes mellitus type 1


Surgical History (Reviewed 09/30/24 @ 15:08 by Yuli Borjas MA)

History of esophagogastroduodenoscopy (EGD)


Family History (Reviewed 10/24/24 @ 12:15 by Mandie Madrigal RN)
Other
 No significant family history



Social History (Updated 10/24/24 @ 12:16 by Mandie Madrigal RN)
Smoking Status:  Current every day smoker tobacco type: e-cigarettes 
alcohol intake:  current 
substance use type:  denies use 
current occupational status:  employed 
Travel in the last 8 weeks:  None 
caffeine:  Yes 



Review of Systems
Review of Systems
Review of systems (narrative): 
Negative
*Cardiovascular
Comments: 
Negative
*Gastrointestinal
Comments: 
Negative
*Genitourinary
Comments: 
Negative
*Musculoskeletal
Comments: 
Negative
*Neurologic
Comments: 
Negative

Meds
Home Medications and Allergies
Home Medications

?Medication ?Instructions ?Recorded ?Confirmed ?Type
insulin lispro 100 unit/mL 4 sliding scale dose SQ 12/21/23 10/24/24 History
subcutaneous cartridge (Humalog USEASDIRECTD   
U-100 Insulin)    
trazodone 100 mg tablet 100 mg PO DAILY 12/21/23 10/24/24 History
semaglutide 0.25 mg or 0.5 mg (2 0.25 mg SQ DAILY 09/30/24 10/24/24 History
mg/3 mL) subcutaneous pen injector    
(Ozempic)    

New Prescriptions to Start

Prescriptions:  


Allergies

Allergy/AdvReac Type Severity Reaction Status Date / Time
watermelon Allergy  Unknown Verified 10/24/24 11:59
   allergy  
   reaction  



Exam
Data for Last 24 hours
Vital signs and Labs for Last 24 Hours: 



Temp Pulse Resp BP Pulse Ox O2 Del Method
 98.1 F   83   16   136/86   97   Room Air
 10/24/24 12:01  10/24/24 12:01  10/24/24 12:01  10/24/24 12:01  10/24/24 12:01  10/24/24 12:01

Laboratory Results - last 24 hr

10/24/24 12:12: POC Glucose 369 H*



I & O for Last 24 hours: 

Intake & Output

 10/21/24 10/22/24 10/23/24 10/24/24
 23:59 23:59 23:59 23:59
Weight  245 lb  



*Routine HEENT Exam
Head: Present normocephalic
Eye: Present EOMI and PERRL
ENT: Present mucous membranes moist
*Routine Neck Exam
Neck: Present supple
*Routine Respiratory Exam
Respiratory: Present CTA bilaterally
*Routine Cardiovascular Exam
Cardiovascular: Present RRR
*Routine Abdominal Exam
Abdominal: Present soft and normoactive bowel sounds; Absent tenderness
*Routine Rectal Exam
Rectal:: deferred
*Routine Genitalia Exam
Genitalia:: deferred
*Routine Extremities Exam
Extremities: Absent cyanosis, clubbing or edema
*Routine Skin Exam
Skin: Present warm; Absent rash
*Routine Neurological Exam
Neurological: Present alert and oriented X3

Assessment and Plan
*Assessment and plan
(1) LUQ pain: 
      Status: Acute
      Category: Medical
      Code(s):
R10.12 - Left upper quadrant pain
(2) Bloating: 
      Status: Acute
      Category: Medical
      Code(s):
R14.0 - Abdominal distension (gaseous)
(3) Dysphagia: 
      Status: Acute
      Category: Medical
      Code(s):
R13.10 - Dysphagia, unspecified
(4) Globus sensation: 
      Status: Acute
      Category: Medical
      Code(s):
R09.A2 - Foreign body sensation, throat
(5) Incomplete defecation: 
      Status: Acute
      Category: Medical
      Code(s):
R15.0 - Incomplete defecation

Plan
A/P: 1.  Left upper quadrant abdominal pain/bloating and dysphagia is the preprocedural diagnosis.  The patient will be anesthetized/sedated using MAC sedation.  The patient has been seen and examined.  Cardiac and lung assessment prior to the 
examination is stable.  Proceed with planned upper endoscopy

## 2024-10-24 NOTE — P.HP_ITS
History of Present Illness    
*Admission Date: 10/24/24    
*Reason for visit:: Dyspepsia    
*History of present illness:     
Mr. Brown is a 34-year-old gentleman who is here for left upper quadrant   
abdominal pain, bloating, reflux and dysphagia.  He has nausea and early   
satiety..  The examination is deemed medically necessary for EGD.  The patient   
has been seen, interviewed and examined prior to the procedure by both myself   
and the anesthesia provider.    
    
[facility]    
Disclaimer:     
The information contained in this section may have been updated after the   
patient was seen, as this information can be updated by other users.    
    
Medical History (Reviewed 09/30/24 @ 15:08 by Yuli Borjas MA)    
    
Diabetes mellitus type 1    
    
    
Surgical History (Reviewed 09/30/24 @ 15:08 by Yuli Borjas MA)    
    
History of esophagogastroduodenoscopy (EGD)    
    
    
Family History (Reviewed 10/24/24 @ 12:15 by Mandie Madrigal RN)    
Other   No significant family history    
    
    
    
Social History (Updated 10/24/24 @ 12:16 by Mandie Madrigal RN)    
Smoking Status:  Current every day smoker tobacco type: e-cigarettes     
alcohol intake:  current     
substance use type:  denies use     
current occupational status:  employed     
Travel in the last 8 weeks:  None     
caffeine:  Yes     
    
    
    
Review of Systems    
Review of Systems    
Review of systems (narrative):     
Negative    
*Cardiovascular    
Comments:     
Negative    
*Gastrointestinal    
Comments:     
Negative    
*Genitourinary    
Comments:     
Negative    
*Musculoskeletal    
Comments:     
Negative    
*Neurologic    
Comments:     
Negative    
    
Meds    
Home Medications and Allergies    
                                Home Medications    
    
    
    
?Medication ?Instructions ?Recorded ?Confirmed ?Type    
     
insulin lispro 100 unit/mL 4 sliding scale dose SQ 12/21/23 10/24/24 History    
    
subcutaneous cartridge (Humalog USEASDIRECTD       
    
U-100 Insulin)        
     
trazodone 100 mg tablet 100 mg PO DAILY 12/21/23 10/24/24 History    
     
semaglutide 0.25 mg or 0.5 mg (2 0.25 mg SQ DAILY 09/30/24 10/24/24 History    
    
mg/3 mL) subcutaneous pen injector        
    
(Ozempic)        
    
    
                           New Prescriptions to Start    
    
Prescriptions:      
    
    
                                    Allergies    
    
    
    
Allergy/AdvReac Type Severity Reaction Status Date / Time    
     
watermelon Allergy  Unknown Verified 10/24/24 11:59    
    
   allergy      
    
   reaction      
    
    
    
    
Exam    
Data for Last 24 hours    
Vital signs and Labs for Last 24 Hours:     
    
                                            
    
    
    
Temp Pulse Resp BP Pulse Ox O2 Del Method    
     
 98.1 F   83   16   136/86   97   Room Air    
     
 10/24/24 12:01  10/24/24 12:01  10/24/24 12:01  10/24/24 12:01  10/24/24 12:01   
10/24/24 12:01    
    
    
                         Laboratory Results - last 24 hr    
    
10/24/24 12:12: POC Glucose 369 H*    
    
    
    
I & O for Last 24 hours:     
    
                                 Intake & Output    
    
    
    
 10/21/24 10/22/24 10/23/24 10/24/24    
    
 23:59 23:59 23:59 23:59    
     
Weight  245 lb      
    
    
    
    
*Routine HEENT Exam    
Head: Present normocephalic    
Eye: Present EOMI and PERRL    
ENT: Present mucous membranes moist    
*Routine Neck Exam    
Neck: Present supple    
*Routine Respiratory Exam    
Respiratory: Present CTA bilaterally    
*Routine Cardiovascular Exam    
Cardiovascular: Present RRR    
*Routine Abdominal Exam    
Abdominal: Present soft and normoactive bowel sounds; Absent tenderness    
*Routine Rectal Exam    
Rectal:: deferred    
*Routine Genitalia Exam    
Genitalia:: deferred    
*Routine Extremities Exam    
Extremities: Absent cyanosis, clubbing or edema    
*Routine Skin Exam    
Skin: Present warm; Absent rash    
*Routine Neurological Exam    
Neurological: Present alert and oriented X3    
    
Assessment and Plan    
*Assessment and plan    
(1) LUQ pain:     
      Status: Acute    
      Category: Medical    
      Code(s):    
R10.12 - Left upper quadrant pain    
(2) Bloating:     
      Status: Acute    
      Category: Medical    
      Code(s):    
R14.0 - Abdominal distension (gaseous)    
(3) Dysphagia:     
      Status: Acute    
      Category: Medical    
      Code(s):    
R13.10 - Dysphagia, unspecified    
(4) Globus sensation:     
      Status: Acute    
      Category: Medical    
      Code(s):    
R09.A2 - Foreign body sensation, throat    
(5) Incomplete defecation:     
      Status: Acute    
      Category: Medical    
      Code(s):    
R15.0 - Incomplete defecation    
    
Plan    
A/P: 1.  Left upper quadrant abdominal pain/bloating and dysphagia is the   
preprocedural diagnosis.  The patient will be anesthetized/sedated using MAC   
sedation.  The patient has been seen and examined.  Cardiac and lung assessment   
prior to the examination is stable.  Proceed with planned upper endoscopy

## 2024-10-24 NOTE — HMH.PROCNOTE
[facility] Procedure Note
Date: 10/24/24
Time: 13:35
Procedure Note:: 
Upper Endoscopy Procedure Report: Esophagogastroduodenoscopy with cold biopsies and TTS balloon dilation

Endoscopost: Rafal Velazquez II, MD

Referring Physician: Hugo Bo MD

Date of Procedure: October 24, 2024

Equipment: Olympus  standard upper endoscope

Sedation: MAC sedation

Indications: Mr. Hsieh is a 34-year-old gentleman with left upper quadrant abdominal pain and bloating.  He reports persistent nausea and early satiety.  He does get some reflux and takes Pepcid and Tums therapy intermittently.  He does get some 
belching, dysphagia and globus sensation.  He reports incomplete defecation with smaller stools, longer periods of time on the commode and excessive wiping.  He had an EGD 5 years ago with Dr. Shen Tapia in [place].  He underwent a gastric 
emptying study that was normal at [facility].

Procedure: 
Prior to the procedure, a history and physical exam was performed, and patient's medications and allergies were reviewed.  The risks, benefits and alternatives of the sedation and procedure were discussed with the patient.  All questions were 
answered and informed consent was obtained.  The patient was brought to the procedure room.  Patient identification and proposed procedure were verified by the physician and the nurse.  The patient was placed in a left lateral decubitus position and 
the scope was passed under direct vision.  Throughout the procedure, the patient's blood pressure, pulse, and oxygen saturations were monitored continuously.  The upper GI endoscopy was accomplished without difficulty.  The patient tolerated the 
procedure well.

Findings:  The scope was passed directly into the upper esophagus and advanced to the third portion of the duodenum. The post bulbar duodenum and duodenal bulb were normal with normal mucosa and conniventes. The scope was withdrawn through a normal 
duodenal bulb and pylorus into the stomach.  There was some mild reactive gastropathy of the antrum.  There was very mild gastritis of the body and fundus with mild reticular pattern and erythema.  Upon retroflexion there was a small 1 to 2 cm 
hiatal hernia.  Cold biopsies were taken along the lesser curvature and incisura.  Biopsies were taken to rule out H. pylori.  The scope was then withdrawn into the esophagus.  There was a distal esophageal ring and there was some corrugation and 
ringlike pattern with esophageal mucosal exudate and minor furrowing.  Cold biopsies were taken from the distal and to rule out eosinophilic esophagitis.  The esophagus was then gently dilated to 18 mm with a TTS hydrostatic balloon.  There was no 
evidence of reflux esophagitis or Elmore's.

Impression: 
1.  Esophageal corrugation/ringlike pattern with some esophageal stenosis/exudate and curling?rule out eosinophilic esophagitis
2.  Very small 1 to 2 cm hiatal hernia
3.  Mild chronic gastritis and mild reactive gastropathy

Plan: I will follow-up the biopsies.  We will discuss additional treatment options today.  I would recommend dietary measures and fiber bowel regimen.  I will place him on PPI therapy and Iberogast.  He may benefit from buspirone for his functional 
dyspepsia.

## 2024-10-24 NOTE — P.PCN_ITS
[facility] Procedure Note    
Date: 10/24/24    
Time: 13:35    
Procedure Note::     
Upper Endoscopy Procedure Report: Esophagogastroduodenoscopy with cold biopsies   
and TTS balloon dilation    
    
Endoscopost: Rafal Velazquez II, MD    
    
Referring Physician: Hugo Bo MD    
    
Date of Procedure: October 24, 2024    
    
Equipment: Olympus  standard upper endoscope    
    
Sedation: MAC sedation    
    
Indications: Mr. Hsieh is a 34-year-old gentleman with left upper quadrant   
abdominal pain and bloating.  He reports persistent nausea and early satiety.    
He does get some reflux and takes Pepcid and Tums therapy intermittently.  He   
does get some belching, dysphagia and globus sensation.  He reports incomplete   
defecation with smaller stools, longer periods of time on the commode and   
excessive wiping.  He had an EGD 5 years ago with Dr. Shen Tapia in [place].  
 He underwent a gastric emptying study that was normal at [facility].    
    
Procedure:     
Prior to the procedure, a history and physical exam was performed, and patient's  
medications and allergies were reviewed.  The risks, benefits and alternatives   
of the sedation and procedure were discussed with the patient.  All questions   
were answered and informed consent was obtained.  The patient was brought to the  
procedure room.  Patient identification and proposed procedure were verified by   
the physician and the nurse.  The patient was placed in a left lateral decubitus  
position and the scope was passed under direct vision.  Throughout the   
procedure, the patient's blood pressure, pulse, and oxygen saturations were   
monitored continuously.  The upper GI endoscopy was accomplished without   
difficulty.  The patient tolerated the procedure well.    
    
Findings:  The scope was passed directly into the upper esophagus and advanced   
to the third portion of the duodenum. The post bulbar duodenum and duodenal bulb  
were normal with normal mucosa and conniventes. The scope was withdrawn through   
a normal duodenal bulb and pylorus into the stomach.  There was some mild   
reactive gastropathy of the antrum.  There was very mild gastritis of the body   
and fundus with mild reticular pattern and erythema.  Upon retroflexion there   
was a small 1 to 2 cm hiatal hernia.  Cold biopsies were taken along the lesser   
curvature and incisura.  Biopsies were taken to rule out H. pylori.  The scope   
was then withdrawn into the esophagus.  There was a distal esophageal ring and   
there was some corrugation and ringlike pattern with esophageal mucosal exudate   
and minor furrowing.  Cold biopsies were taken from the distal and to rule out   
eosinophilic esophagitis.  The esophagus was then gently dilated to 18 mm with a  
TTS hydrostatic balloon.  There was no evidence of reflux esophagitis or   
Elmore's.    
    
Impression:     
1.  Esophageal corrugation/ringlike pattern with some esophageal   
stenosis/exudate and curling?rule out eosinophilic esophagitis    
2.  Very small 1 to 2 cm hiatal hernia    
3.  Mild chronic gastritis and mild reactive gastropathy    
    
Plan: I will follow-up the biopsies.  We will discuss additional treatment   
options today.  I would recommend dietary measures and fiber bowel regimen.  I   
will place him on PPI therapy and Iberogast.  He may benefit from buspirone for   
his functional dyspepsia.

## 2024-10-24 NOTE — P.PNANES_ITS
[facility]    
Disclaimer:     
The information contained in this section may have been updated after the   
patient was seen, as this information can be updated by other users.    
    
Medical History (Reviewed 09/30/24 @ 15:08 by Yuli Borjas MA)    
    
Diabetes mellitus type 1    
    
    
Surgical History (Reviewed 09/30/24 @ 15:08 by Yuli Borjas MA)    
    
History of esophagogastroduodenoscopy (EGD)    
    
    
Family History (Reviewed 10/24/24 @ 12:15 by Mandie Madrigal RN)    
Other   No significant family history    
    
    
    
Social History (Updated 10/24/24 @ 12:16 by Mandie Madrigal RN)    
Smoking Status:  Current every day smoker tobacco type: e-cigarettes     
alcohol intake:  current     
substance use type:  denies use     
current occupational status:  employed     
Travel in the last 8 weeks:  None     
caffeine:  Yes     
    
    
    
[facility] Anesthesia Checklist    
Patient Identification    
Patient Identification: Arm Band    
Structural Data    
Admitted From: Home    
Planned Operative Procedure/s: EGD    
Consent for Planned Operative Procedure(s) Verified: Yes    
Verified Documents: Surgical Consent and History and Physical    
NPO Status Verified    
Time NPO: 05:00 ([employer] at 0500.  [employer] + Sprite at 0945.  )    
Additional verifications    
Anesthesia Reactions: No    
Airway Assessment    
Mallampati Score:: Class II    
C-Spine Mobility Assessed: Yes    
TMJ Mobility Assessed: Yes    
Dentition: Good Dentition    
Neurological Assessment    
Level of Consciousness: Awake, Alert and Appropriate    
Anesthesia Plan    
Anesthesia Risk discussed: Yes    
Anesthesia Plan: Verified    
ASA Class: III    
Anesthesia Type: MAC

## 2024-10-24 NOTE — EXP.ANES.CKL
[facility]
Disclaimer: 
The information contained in this section may have been updated after the patient was seen, as this information can be updated by other users.

Medical History (Reviewed 09/30/24 @ 15:08 by Yuli Borjas MA)

Diabetes mellitus type 1


Surgical History (Reviewed 09/30/24 @ 15:08 by Yuli Borjas MA)

History of esophagogastroduodenoscopy (EGD)


Family History (Reviewed 10/24/24 @ 12:15 by Mandie Madrigal RN)
Other
 No significant family history



Social History (Updated 10/24/24 @ 12:16 by Mandie Madrigal RN)
Smoking Status:  Current every day smoker tobacco type: e-cigarettes 
alcohol intake:  current 
substance use type:  denies use 
current occupational status:  employed 
Travel in the last 8 weeks:  None 
caffeine:  Yes 



[facility] Anesthesia Checklist
Patient Identification
Patient Identification: Arm Band
Structural Data
Admitted From: Home
Planned Operative Procedure/s: EGD
Consent for Planned Operative Procedure(s) Verified: Yes
Verified Documents: Surgical Consent and History and Physical
NPO Status Verified
Time NPO: 05:00 ([employer] at 0500.  Energy [employer]ink + Sprite at 0945.  )
Additional verifications
Anesthesia Reactions: No
Airway Assessment
Mallampati Score:: Class II
C-Spine Mobility Assessed: Yes
TMJ Mobility Assessed: Yes
Dentition: Good Dentition
Neurological Assessment
Level of Consciousness: Awake, Alert and Appropriate
Anesthesia Plan
Anesthesia Risk discussed: Yes
Anesthesia Plan: Verified
ASA Class: III
Anesthesia Type: MAC

## 2024-11-06 ENCOUNTER — TELEMEDICINE (OUTPATIENT)
Dept: PSYCHIATRY | Facility: CLINIC | Age: 34
End: 2024-11-06
Payer: COMMERCIAL

## 2024-11-06 DIAGNOSIS — F41.1 GENERALIZED ANXIETY DISORDER: Primary | ICD-10-CM

## 2024-11-06 NOTE — PROGRESS NOTES
Date: November 7, 2024  Time In: 1500  Time Out: 1600  This provider is located at home address for [facility] (through [facility]), [address], [place] [address] using a secure Reflex Systemshart Video Visit through [employer]. Patient is being seen remotely via telehealth at home address in [place] and stated they are in a secure environment for this session. The patient's condition being diagnosed/treated is appropriate for telemedicine. The provider identified herself as well as her credentials. The patient, and/or patients guardian, consent to be seen remotely, and when consent is given they understand that the consent allows for patient identifiable information to be sent to a third party as needed. They may refuse to be seen remotely at any time. The electronic data is encrypted and password protected, and the patient and/or guardian has been advised of the potential risks to privacy not withstanding such measures.     You have chosen to receive care through a telehealth visit.  Do you consent to use a video/audio connection for your medical care today? Yes    PROGRESS NOTE  Data:  Kieran Canela is a 34 y.o. male who presents today for follow up    Chief Complaint: anxiety    History of Present Illness: Pt shared triggers of anxiety. Pt provided insight on current triggers and emotions attached to thoughts. Pt discussed difficulty communicating needs within marriage at times due to feeling as if needs are not being acknowledged. Pt reports that couples' therapy was completed but wife asked to go thereafter for individual therapy.       Clinical Maneuvering/Intervention:    (Scales based on 0 - 10 with 10 being the worst)  Depression: 0 Anxiety: 2     Assisted patient in processing above session content; acknowledged and normalized patient’s thoughts, feelings, and concerns.  Rationalized patient thought process regarding recent stressors and life events.  Discussed triggers associated with patient's emotions. Also discussed coping skills for patient to implement. Therapist assisted with processing emotions and thoughts correlated to identified stressors. Discussed limitations associated with identified stressors. Therapist offered alternative perspectives, support, and validation as needed. Discussed communication and communication styles.     Reviewed treatment goals and progress regarding identified goals. Progress remains on going at this time. Discussed expectations for next session.     Allowed patient to freely discuss issues without interruption or judgment. Provided safe, confidential environment to facilitate the development of positive therapeutic relationship and encourage open, honest communication. Assisted patient in identifying risk factors which would indicate the need for higher level of care including thoughts to harm self or others and/or self-harming behavior and encouraged patient to contact this office, call 911, or present to the nearest emergency room should any of these events occur. Discussed crisis intervention services and means to access. Patient adamantly and convincingly denies current suicidal or homicidal ideation or perceptual disturbance.    Assessment:   Assessment   Patient appears to maintain relative stability as compared to their baseline.  However, patient continues to struggle with anxiety which continues to cause impairment in important areas of functioning.  A result, they can be reasonably expected to continue to benefit from treatment and would likely be at increased risk for decompensation otherwise.    Mental Status Exam:   Hygiene:   good  Cooperation:  Cooperative  Eye Contact:  Good  Psychomotor Behavior:  Appropriate  Affect:  Appropriate  Mood: normal  Speech:  Normal  Thought Process:  Linear  Thought Content:  Mood congruent  Suicidal:  None  Homicidal:  None  Hallucinations:  None  Delusion:  None  Memory:   Intact  Orientation:  Person, Place, Time and Situation  Reliability:  fair  Insight:  Fair  Judgement:  Fair  Impulse Control:  Fair  Physical/Medical Issues:  No      Patient's Support Network Includes:  wife    Functional Status: Mild impairment     Progress toward goal: Not at goal    Prognosis: Fair with Ongoing Treatment     Plan:    Patient will continue in individual outpatient therapy with focus on improved functioning and coping skills, maintaining stability, and avoiding decompensation and the need for higher level of care.    Patient will adhere to medication regimen as prescribed and report any side effects. Patient will contact this office, call 911 or present to the nearest emergency room should suicidal or homicidal ideations occur. Provide Cognitive Behavioral Therapy and Solution Focused Therapy to improve functioning, maintain stability, and avoid decompensation and the need for higher level of care.     Return in about 6 weeks, or earlier if symptoms worsen or fail to improve.      VISIT DIAGNOSIS:     ICD-10-CM ICD-9-CM   1. Generalized anxiety disorder  F41.1 300.02        Diagnoses and all orders for this visit:    1. Generalized anxiety disorder (Primary)           [facility] No Show Policy:  We understand unexpected circumstances arise; however, anytime you miss your appointment we are unable to provide you appropriate care.  In addition, each appointment missed could have been used to provide care for others.  We ask that you call at least 24 hours in advance to cancel or reschedule an appointment.  We would like to take this opportunity to remind you of our policy stating patients who miss THREE or more appointments without cancelling or rescheduling 24 hours in advance of the appointment may be subject to cancellation of any further visits with our clinic and recommendation to seek in-person services/visits.    Please call [phone] to reschedule your  appointment. If there are reasons that make it difficult for you to keep the appointments, please call and let us know how we can help.  Please understand that medication prescribing will not continue without seeing your provider.      [facility]'s No Show Policy reviewed with patient at today's visit. Patient verbalized understanding of this policy. Discussed with patient that in the event that there are three or more no show visits, it will be recommended that they pursue in-person services/visits as noncompliance with telehealth visits indicates that patient is not an appropriate candidate for telemedicine and would likely be more appropriate for in-person services/visits. Patient verbalizes understanding and is agreeable to this.        This document has been electronically signed by Sherine Rooney LCSW.  November 7, 2024 10:30 EST      Part of this note may be an electronic transcription/translation of spoken language to printed text using the Dragon Dictation System.

## 2024-11-14 ENCOUNTER — TELEPHONE (OUTPATIENT)
Dept: FAMILY MEDICINE CLINIC | Facility: CLINIC | Age: 34
End: 2024-11-14

## 2024-11-14 NOTE — TELEPHONE ENCOUNTER
Caller: Kieran Canela    Relationship: Self    Best call back number: [phone]     What medication are you requesting: SOMETHING FOR SYMPTOM    What are your current symptoms: SINUS INFECTION AND COUGH    If a prescription is needed, what is your preferred pharmacy and phone number: [id number] - [place], [place] - [address] - [phone] [facility] [phone] FX     Additional notes:PATIENT WANTS A CALL BACK WHEN MEDICATION IS SENT

## 2024-11-15 ENCOUNTER — OFFICE VISIT (OUTPATIENT)
Dept: ENDOCRINOLOGY | Facility: CLINIC | Age: 34
End: 2024-11-15
Payer: COMMERCIAL

## 2024-11-15 VITALS
HEIGHT: 74 IN | BODY MASS INDEX: 32.85 KG/M2 | SYSTOLIC BLOOD PRESSURE: 132 MMHG | HEART RATE: 96 BPM | OXYGEN SATURATION: 99 % | WEIGHT: 256 LBS | DIASTOLIC BLOOD PRESSURE: 68 MMHG

## 2024-11-15 DIAGNOSIS — E10.65 TYPE 1 DIABETES MELLITUS WITH HYPERGLYCEMIA: Primary | ICD-10-CM

## 2024-11-15 PROBLEM — E10.9 TYPE 1 DIABETES MELLITUS: Status: ACTIVE | Noted: 2020-09-18

## 2024-11-15 LAB
EXPIRATION DATE: ABNORMAL
EXPIRATION DATE: ABNORMAL
GLUCOSE BLDC GLUCOMTR-MCNC: 304 MG/DL (ref 70–130)
HBA1C MFR BLD: 8.4 % (ref 4.5–5.7)
Lab: ABNORMAL
Lab: ABNORMAL

## 2024-11-15 RX ORDER — INSULIN LISPRO 100 [IU]/ML
INJECTION, SOLUTION INTRAVENOUS; SUBCUTANEOUS
Qty: 90 ML | Refills: 1 | Status: SHIPPED | OUTPATIENT
Start: 2024-11-15

## 2024-11-15 NOTE — ASSESSMENT & PLAN NOTE
Diabetes is worsening.   Continue current treatment regimen.  Recommended an ADA diet.  Regular aerobic exercise.  Reminded to get yearly retinal exam.    Review of pump data shows that patient is entering carb counts before meals regularly but his blood sugars are staying elevated after most meals. Made adjustments in pump settings today:  Correction factor reduced from 1:50 to 1:40, 12 am-6 am and reduced from 1:40 to 1:35 from 6 am-6 pm  Carb ratio reduced from 1:15 to 1:12 6 am-6 pm  Basal rate increased to 1.45 to better reflect basal insulin is getting through [employer] and basal limit increased to 2.  Turned off sleep schedules. Patient can still turn this on if needed overnight if running low.    He will call if having any trouble with these adjustments.    Check urine microalbumin/creatinine ration today. Further recommendations based on results.      Diabetes will be reassessed in 3 months

## 2024-11-18 ENCOUNTER — OFFICE VISIT (OUTPATIENT)
Dept: FAMILY MEDICINE CLINIC | Facility: CLINIC | Age: 34
End: 2024-11-18
Payer: COMMERCIAL

## 2024-11-18 VITALS
RESPIRATION RATE: 20 BRPM | SYSTOLIC BLOOD PRESSURE: 138 MMHG | HEART RATE: 76 BPM | TEMPERATURE: 97.8 F | HEIGHT: 74 IN | WEIGHT: 255 LBS | DIASTOLIC BLOOD PRESSURE: 85 MMHG | OXYGEN SATURATION: 98 % | BODY MASS INDEX: 32.73 KG/M2

## 2024-11-18 DIAGNOSIS — J01.00 ACUTE NON-RECURRENT MAXILLARY SINUSITIS: Primary | ICD-10-CM

## 2024-11-18 PROCEDURE — 99213 OFFICE O/P EST LOW 20 MIN: CPT | Performed by: FAMILY MEDICINE

## 2024-11-18 NOTE — PROGRESS NOTES
"Chief Complaint   Patient presents with    sinus congestion & PND & cough      For the past few days        Subjective      Kieran Canela is a 34 y.o. who presents for 1 week of URI symptoms that have worsened and are now concentrated in the head with nasal discharge that is quite foul-smelling, sinus pain and pressure both above and below the eyes.    Objective   Vital Signs:  /85   Pulse 76   Temp 97.8 °F (36.6 °C)   Resp 20   Ht 188 cm (74\")   Wt 116 kg (255 lb)   SpO2 98%   BMI 32.74 kg/m²     Physical Exam  Constitutional:       Appearance: Normal appearance.   HENT:      Head: Normocephalic and atraumatic.      Right Ear: Tympanic membrane and ear canal normal.      Left Ear: Tympanic membrane and ear canal normal.      Nose: Congestion and rhinorrhea present.      Comments: Right maxillary sinus tenderness     Mouth/Throat:      Mouth: Mucous membranes are moist.      Pharynx: Oropharynx is clear.   Eyes:      Conjunctiva/sclera: Conjunctivae normal.   Cardiovascular:      Rate and Rhythm: Normal rate and regular rhythm.      Heart sounds: Normal heart sounds. No murmur heard.  Pulmonary:      Effort: Pulmonary effort is normal. No respiratory distress.      Breath sounds: Normal breath sounds.   Musculoskeletal:      Cervical back: Normal range of motion and neck supple. No tenderness.   Lymphadenopathy:      Cervical: No cervical adenopathy.   Skin:     General: Skin is warm and dry.   Neurological:      Mental Status: He is alert.   Psychiatric:         Mood and Affect: Mood normal.          Result Review                     Assessment and Plan  Diagnoses and all orders for this visit:    1. Acute non-recurrent maxillary sinusitis (Primary)  Comments:  Mild acute sinusitis.  Use Augmentin twice daily.  Recommended nasal saline  Orders:  -     amoxicillin-clavulanate (AUGMENTIN) 875-125 MG per tablet; Take 1 tablet by mouth 2 (Two) Times a Day.  Dispense: 14 tablet; Refill: " 0            Follow Up  No follow-ups on file.  Patient was given instructions and counseling regarding his condition or for health maintenance advice. Please see specific information pulled into the AVS if appropriate.

## 2024-11-20 NOTE — TELEPHONE ENCOUNTER
Caller: Kieran Canela    Relationship: Self    Best call back number: [phone]     Which medication are you concerned about: Semaglutide,0.25 or 0.5MG/DOS, (Ozempic, 0.25 or 0.5 MG/DOSE,) 2 MG/1.5ML solution pen-injector     What are your concerns: PATIENT STATES THIS MEDICATION IS NEEDING A PRIOR AUTHORIZATION.          SW/JENNA Discharge Plan  Informed patient is ready for discharge.  Patient to be picked up by sonGideon. Patient/interested person has been counseled for post hospitalization care.  Patient agrees and understands goals and plan. Initial implementation of the patient’s discharge plan has been arranged, including any devices/equipment needed for discharge. Discharge plan communicated to MD, RN, and [facility]. ALEXIS Lazo asked CM if there is a coupon for pt's jardiance.  asked JENNA Koehler and she sent a coupon that was given to the pt by ALEXIS Wilkes.    Patient’s discharge destination is Home with services/support (@ 2874 CM received call from [facility] [employer], Rev- Per, Ty pt is current with them and has home health orders for RN and PT.).    Selected Continued Care - Discharged on 11/20/2024 Admission date: 11/16/2024 - Discharge disposition: Still a Patient      Home Medical Care Coordination complete.      Service Provider Selected Services Address Phone Fax    [facility] [address] [place] [id number] [phone] --                  @ 0330 JENNA sent updated clinicals and resumption home health orders. To [employer] via [employer]. Pt is discharging today.      .@ 7061 JENNA called [employer] and spoke to Iris [phone]. She received the home health orders and is aware pt is discharging today.

## 2024-11-24 ENCOUNTER — HOSPITAL ENCOUNTER (EMERGENCY)
Age: 34
LOS: 1 days | Discharge: LEFT BEFORE DISCHARGE | End: 2024-11-25
Payer: COMMERCIAL

## 2024-11-24 VITALS
DIASTOLIC BLOOD PRESSURE: 91 MMHG | SYSTOLIC BLOOD PRESSURE: 135 MMHG | TEMPERATURE: 99.2 F | RESPIRATION RATE: 18 BRPM | OXYGEN SATURATION: 97 % | HEART RATE: 125 BPM

## 2024-11-24 VITALS — BODY MASS INDEX: 32.1 KG/M2

## 2024-11-24 DIAGNOSIS — R00.0: ICD-10-CM

## 2024-11-24 DIAGNOSIS — E86.0: ICD-10-CM

## 2024-11-24 DIAGNOSIS — R50.9: ICD-10-CM

## 2024-11-24 DIAGNOSIS — A04.72: Primary | ICD-10-CM

## 2024-11-24 LAB
ALBUMIN LEVEL: 4.1 G/DL (ref 3.5–5)
ALBUMIN/GLOB SERPL: 1.5 {RATIO} (ref 1.1–1.8)
ALP ISO SERPL-ACNC: 89 U/L (ref 38–126)
ALT SERPLBLD-CCNC: 43 U/L (ref 12–78)
ANION GAP SERPL CALC-SCNC: 12.7 MEQ/L (ref 5–15)
AST SERPL QL: 33 U/L (ref 17–59)
BILIRUBIN,TOTAL: 0.8 MG/DL (ref 0.2–1.3)
BUN SERPL-MCNC: 16 MG/DL (ref 9–20)
CALCIUM SPEC-MCNC: 8.5 MG/DL (ref 8.4–10.2)
CELLS COUNTED: 100
CHLORIDE SPEC-SCNC: 99 MMOL/L (ref 98–107)
CO2 SERPL-SCNC: 25 MMOL/L (ref 22–30)
CREAT BLD-SCNC: 1.2 MG/DL (ref 0.66–1.25)
CREATININE CLEARANCE ESTIMATED: 142 ML/MIN (ref 50–200)
ESTIMATED GLOMERULAR FILT RATE: 70 ML/MIN (ref 60–?)
GFR (AFRICAN AMERICAN): 85 ML/MIN (ref 60–?)
GLOBULIN SER CALC-MCNC: 2.8 G/DL (ref 1.3–3.2)
GLUCOSE: 268 MG/DL (ref 74–100)
HCT VFR BLD CALC: 45.8 % (ref 42–52)
HGB BLD-MCNC: 16 G/DL (ref 14.1–18)
LIPASE: 24 U/L (ref 23–300)
MAGNESIUM: 1.4 MG/DL (ref 1.6–2.3)
MANUAL DIFFERENTIAL: (no result)
MCHC RBC-ENTMCNC: 34.9 G/DL (ref 31.8–35.4)
MCV RBC: 85.2 FL (ref 80–94)
MEAN CORPUSCULAR HEMOGLOBIN: 29.7 PG (ref 27–31.2)
PLATELET # BLD: 292 K/MM3 (ref 142–424)
POTASSIUM: 3.7 MMOL/L (ref 3.5–5.1)
PROT SERPL-MCNC: 6.9 G/DL (ref 6.3–8.2)
RBC # BLD AUTO: 5.37 M/MM3 (ref 4.6–6.2)
SODIUM SPEC-SCNC: 133 MMOL/L (ref 136–145)
T4 (THYROXINE): 7.6 UG/DL (ref 5.53–11)
WBC # BLD AUTO: 16.2 K/MM3 (ref 4.8–10.8)

## 2024-11-24 PROCEDURE — 96361 HYDRATE IV INFUSION ADD-ON: CPT

## 2024-11-24 PROCEDURE — 87636 SARSCOV2 & INF A&B AMP PRB: CPT

## 2024-11-24 PROCEDURE — 84436 ASSAY OF TOTAL THYROXINE: CPT

## 2024-11-24 PROCEDURE — 87040 BLOOD CULTURE FOR BACTERIA: CPT

## 2024-11-24 PROCEDURE — 96365 THER/PROPH/DIAG IV INF INIT: CPT

## 2024-11-24 PROCEDURE — 93005 ELECTROCARDIOGRAM TRACING: CPT

## 2024-11-24 PROCEDURE — 80053 COMPREHEN METABOLIC PANEL: CPT

## 2024-11-24 PROCEDURE — 87507 IADNA-DNA/RNA PROBE TQ 12-25: CPT

## 2024-11-24 PROCEDURE — 81001 URINALYSIS AUTO W/SCOPE: CPT

## 2024-11-24 PROCEDURE — 85007 BL SMEAR W/DIFF WBC COUNT: CPT

## 2024-11-24 PROCEDURE — 80050 GENERAL HEALTH PANEL: CPT

## 2024-11-24 PROCEDURE — 83735 ASSAY OF MAGNESIUM: CPT

## 2024-11-24 PROCEDURE — 70450 CT HEAD/BRAIN W/O DYE: CPT

## 2024-11-24 PROCEDURE — 82803 BLOOD GASES ANY COMBINATION: CPT

## 2024-11-24 PROCEDURE — 85025 COMPLETE CBC W/AUTO DIFF WBC: CPT

## 2024-11-24 PROCEDURE — 87633 RESP VIRUS 12-25 TARGETS: CPT

## 2024-11-24 PROCEDURE — 84443 ASSAY THYROID STIM HORMONE: CPT

## 2024-11-24 PROCEDURE — 83690 ASSAY OF LIPASE: CPT

## 2024-11-24 PROCEDURE — 96375 TX/PRO/DX INJ NEW DRUG ADDON: CPT

## 2024-11-24 PROCEDURE — 96374 THER/PROPH/DIAG INJ IV PUSH: CPT

## 2024-11-24 PROCEDURE — 71045 X-RAY EXAM CHEST 1 VIEW: CPT

## 2024-11-24 PROCEDURE — 99285 EMERGENCY DEPT VISIT HI MDM: CPT

## 2024-11-24 PROCEDURE — 74177 CT ABD & PELVIS W/CONTRAST: CPT

## 2024-11-25 VITALS
TEMPERATURE: 98.2 F | HEART RATE: 125 BPM | SYSTOLIC BLOOD PRESSURE: 145 MMHG | OXYGEN SATURATION: 94 % | DIASTOLIC BLOOD PRESSURE: 69 MMHG | RESPIRATION RATE: 23 BRPM

## 2024-11-25 LAB
COLOR UR: YELLOW
GLUCOSE UR QL STRIP.AUTO: (no result)
HCO3 BLDV-SCNC: 19.5 MMOL/L (ref 23–30)
LACTATE VENOUS: 1.9 MMOL/L (ref 0.4–2)
MICRO URNS: (no result)
PCO2 BLDV: 28.2 MMOL/L (ref 35–51)
PH BLDV: 7.46 MMOL/L (ref 7.31–7.41)
PH UR: 5.5 [PH] (ref 5–8.5)
PO2 BLDV: 115.8 MMOL/L (ref 28–40)
SP GR UR: 1.02 (ref 1–1.03)
TSH SERPL-ACNC: 0.89 UIU/ML (ref 0.47–4.68)
UROBILINOGEN UR QL: 0.2 EU/DL

## 2024-12-20 ENCOUNTER — HOSPITAL ENCOUNTER (OUTPATIENT)
Dept: HOSPITAL 22 - LAB | Age: 34
Discharge: HOME | End: 2024-12-20
Payer: COMMERCIAL

## 2024-12-20 DIAGNOSIS — K20.0: Primary | ICD-10-CM

## 2024-12-20 PROCEDURE — 86003 ALLG SPEC IGE CRUDE XTRC EA: CPT

## 2024-12-20 PROCEDURE — 86008 ALLG SPEC IGE RECOMB EA: CPT

## 2024-12-20 PROCEDURE — 36415 COLL VENOUS BLD VENIPUNCTURE: CPT

## 2025-01-16 ENCOUNTER — TELEMEDICINE (OUTPATIENT)
Dept: PSYCHIATRY | Facility: CLINIC | Age: 35
End: 2025-01-16
Payer: COMMERCIAL

## 2025-01-16 DIAGNOSIS — Z79.899 MEDICATION MANAGEMENT: ICD-10-CM

## 2025-01-16 DIAGNOSIS — G47.9 SLEEP DISTURBANCE: ICD-10-CM

## 2025-01-16 DIAGNOSIS — F41.0 PANIC ANXIETY SYNDROME: Primary | ICD-10-CM

## 2025-01-16 RX ORDER — FLUOXETINE 10 MG/1
CAPSULE ORAL
Qty: 60 CAPSULE | Refills: 0 | Status: SHIPPED | OUTPATIENT
Start: 2025-01-16

## 2025-01-16 RX ORDER — PROPRANOLOL HYDROCHLORIDE 10 MG/1
10 TABLET ORAL 2 TIMES DAILY PRN
Qty: 60 TABLET | Refills: 0 | Status: SHIPPED | OUTPATIENT
Start: 2025-01-16

## 2025-01-16 RX ORDER — NYSTATIN AND TRIAMCINOLONE ACETONIDE 100000; 1 [USP'U]/G; MG/G
CREAM TOPICAL
Qty: 60 G | Refills: 0 | OUTPATIENT
Start: 2025-01-16

## 2025-01-16 RX ORDER — TRAZODONE HYDROCHLORIDE 100 MG/1
200 TABLET ORAL NIGHTLY PRN
Qty: 60 TABLET | Refills: 0 | Status: SHIPPED | OUTPATIENT
Start: 2025-01-16
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
## 2025-01-17 ENCOUNTER — PRIOR AUTHORIZATION (OUTPATIENT)
Dept: PSYCHIATRY | Facility: CLINIC | Age: 35
End: 2025-01-17
Payer: COMMERCIAL

## 2025-01-17 ENCOUNTER — TELEPHONE (OUTPATIENT)
Dept: FAMILY MEDICINE CLINIC | Facility: CLINIC | Age: 35
End: 2025-01-17
Payer: COMMERCIAL

## 2025-01-17 RX ORDER — NYSTATIN AND TRIAMCINOLONE ACETONIDE 100000; 1 [USP'U]/G; MG/G
CREAM TOPICAL
Qty: 60 G | Refills: 0 | OUTPATIENT
Start: 2025-01-17

## 2025-01-27 ENCOUNTER — HOSPITAL ENCOUNTER (OUTPATIENT)
Dept: HOSPITAL 22 - LAB | Age: 35
Discharge: HOME | End: 2025-01-27
Payer: COMMERCIAL

## 2025-01-27 DIAGNOSIS — R14.0: Primary | ICD-10-CM

## 2025-01-27 PROCEDURE — 82656 EL-1 FECAL QUAL/SEMIQ: CPT

## 2025-01-27 RX ORDER — NYSTATIN AND TRIAMCINOLONE ACETONIDE 100000; 1 [USP'U]/G; MG/G
CREAM TOPICAL
Qty: 60 G | Refills: 0 | OUTPATIENT
Start: 2025-01-27

## 2025-01-29 LAB — PANCREATIC ELASTASE, FECAL: >800 (ref 200–?)

## 2025-02-03 ENCOUNTER — OFFICE VISIT (OUTPATIENT)
Dept: FAMILY MEDICINE CLINIC | Facility: CLINIC | Age: 35
End: 2025-02-03
Payer: COMMERCIAL

## 2025-02-03 VITALS
HEIGHT: 74 IN | DIASTOLIC BLOOD PRESSURE: 80 MMHG | WEIGHT: 258 LBS | RESPIRATION RATE: 14 BRPM | TEMPERATURE: 98.4 F | SYSTOLIC BLOOD PRESSURE: 130 MMHG | HEART RATE: 63 BPM | OXYGEN SATURATION: 97 % | BODY MASS INDEX: 33.11 KG/M2

## 2025-02-03 DIAGNOSIS — K21.9 GASTROESOPHAGEAL REFLUX DISEASE, UNSPECIFIED WHETHER ESOPHAGITIS PRESENT: ICD-10-CM

## 2025-02-03 DIAGNOSIS — B37.2 YEAST DERMATITIS: ICD-10-CM

## 2025-02-03 DIAGNOSIS — G47.9 SLEEP DISTURBANCE: ICD-10-CM

## 2025-02-03 DIAGNOSIS — F41.9 ANXIETY: Primary | ICD-10-CM

## 2025-02-03 DIAGNOSIS — Z79.899 MEDICATION MANAGEMENT: ICD-10-CM

## 2025-02-03 PROCEDURE — 99214 OFFICE O/P EST MOD 30 MIN: CPT | Performed by: NURSE PRACTITIONER

## 2025-02-03 RX ORDER — PROPRANOLOL HCL 20 MG
20 TABLET ORAL 2 TIMES DAILY
Qty: 60 TABLET | Refills: 1 | Status: SHIPPED | OUTPATIENT
Start: 2025-02-03 | End: 2025-02-05 | Stop reason: SDUPTHER

## 2025-02-03 NOTE — PROGRESS NOTES
Date: 2025   Patient Name: Kieran Canela  : 1990   MRN: [id number]     Chief Complaint:    Chief Complaint   Patient presents with    Hypertension       History of Present Illness: Kieran Canela is a 34 y.o. male who is here today to follow up for 130/80 at home  Feeling of pressure in throat   -250 ranging   No recent illnesses, no other associated symptoms.   Under a lot of stress and anxiety-transmission out of truck    Right ear is itching, that has been present for 8-9 months, putting body lotion on it without relief of symptoms    Needing refills on medications in MAR    Hypertension       Review of Systems:   Review of Systems   HENT:  Positive for ear pain (right ear itching).    Psychiatric/Behavioral:  Positive for stress.        I have reviewed the patients family history, social history, past medical history, past surgical history and have updated it as appropriate.     Medications:     Current Outpatient Medications:     FLUoxetine (PROzac) 10 MG capsule, Take 1 cap by mouth daily x 3 days, then increase to 2 caps by mouth daily thereafter., Disp: 60 capsule, Rfl: 0    glucagon (GLUCAGEN) 1 MG injection, Inject 1 mg into the appropriate muscle as directed by prescriber 1 (One) Time. Use in the event of unconsciousness, Disp: , Rfl:     glucose (DEX4) 4 GM chewable tablet, Chew 4 tablets., Disp: , Rfl:     HumaLOG 100 UNIT/ML injection, Inject up to 100 units daily in insulin pump, Disp: 90 mL, Rfl: 1    loratadine (Claritin) 10 MG tablet, Take 1 tablet by mouth Daily., Disp: 30 tablet, Rfl: 2    nystatin (MYCOSTATIN) 907875 UNIT/GM cream, Apply 1 Application topically to the appropriate area as directed 2 (Two) Times a Day., Disp: 30 g, Rfl: 1    omeprazole (priLOSEC) 20 MG capsule, Take 1 capsule by mouth Daily., Disp: 90 capsule, Rfl: 0    propranolol (INDERAL) 20 MG tablet, Take 1 tablet by mouth 2 (Two) Times a Day. For anxiety, Disp: 60 tablet, Rfl: 1     "traZODone (DESYREL) 100 MG tablet, Take 2 tablets by mouth At Night As Needed for Sleep., Disp: 60 tablet, Rfl: 0    Allergies:   Allergies   Allergen Reactions    Citrullus Vulgaris Swelling    Lisinopril Cough       PHQ-9 Total Score:      Physical Exam:  Vital Signs:   Vitals:    02/03/25 1501   BP: 130/80   Pulse: 63   Resp: 14   Temp: 98.4 °F (36.9 °C)   SpO2: 97%   Weight: 117 kg (258 lb)   Height: 188 cm (74\")     Body mass index is 33.13 kg/m².   BMI is >= 30 and <35. (Class 1 Obesity). The following options were offered after discussion;: weight loss educational material (shared in after visit summary)       Physical Exam  Vitals and nursing note reviewed.   Constitutional:       Appearance: Normal appearance.   HENT:      Head: Normocephalic and atraumatic.      Right Ear: Tympanic membrane and external ear normal.      Left Ear: Tympanic membrane, ear canal and external ear normal.      Ears:      Comments: Right ear canal with dry skin  Cardiovascular:      Rate and Rhythm: Normal rate and regular rhythm.   Pulmonary:      Effort: Pulmonary effort is normal.      Breath sounds: Normal breath sounds.   Abdominal:      General: Bowel sounds are normal.   Skin:     General: Skin is warm.   Neurological:      General: No focal deficit present.      Mental Status: He is alert and oriented to person, place, and time.   Psychiatric:         Mood and Affect: Mood normal.           Assessment/Plan:   Diagnoses and all orders for this visit:    1. Anxiety (Primary)  Comments:  starting on propranolol to aid in symptoms. increase water intake. monitor for worsening symptoms. monitor BP daily  Orders:  -     Discontinue: propranolol (INDERAL) 20 MG tablet; Take 1 tablet by mouth 2 (Two) Times a Day. For anxiety  Dispense: 60 tablet; Refill: 1  -     propranolol (INDERAL) 20 MG tablet; Take 1 tablet by mouth 2 (Two) Times a Day. For anxiety  Dispense: 60 tablet; Refill: 1    2. Gastroesophageal reflux disease, " unspecified whether esophagitis present  Comments:  refills sent to pharmacy, well managed with medication.  Orders:  -     omeprazole (priLOSEC) 20 MG capsule; Take 1 capsule by mouth Daily.  Dispense: 90 capsule; Refill: 0    3. Sleep disturbance  Comments:  refills sent to pharmacy, well managed with medication.  Orders:  -     traZODone (DESYREL) 100 MG tablet; Take 2 tablets by mouth At Night As Needed for Sleep.  Dispense: 60 tablet; Refill: 0    4. Medication management  -     traZODone (DESYREL) 100 MG tablet; Take 2 tablets by mouth At Night As Needed for Sleep.  Dispense: 60 tablet; Refill: 0    5. Yeast dermatitis  Comments:  refills sent to pharmacy, well managed with medication.  Orders:  -     nystatin (MYCOSTATIN) 285037 UNIT/GM cream; Apply 1 Application topically to the appropriate area as directed 2 (Two) Times a Day.  Dispense: 30 g; Refill: 1           Follow Up:   Return in about 3 months (around 5/3/2025) for Next scheduled follow up PCP.      Ivon Martinez. MELODY   [facility]

## 2025-02-04 ENCOUNTER — TELEPHONE (OUTPATIENT)
Dept: FAMILY MEDICINE CLINIC | Facility: CLINIC | Age: 35
End: 2025-02-04
Payer: COMMERCIAL

## 2025-02-04 NOTE — TELEPHONE ENCOUNTER
PATIENT SEEN LISA YESTERDAY AT HIS APPOINTMENT, HE STATED THAT THERE WERE FOUR MEDICATIONS THAT HE WAS NEEDING REFILLS ON AND THOUGHT SHE WAS SENDING THEM INTO HIS PHARMACY BUT ONLY ONE WAS SENT IN.        PLEASE ADVISE

## 2025-02-05 RX ORDER — NYSTATIN 100000 U/G
1 CREAM TOPICAL 2 TIMES DAILY
Qty: 30 G | Refills: 1 | Status: SHIPPED | OUTPATIENT
Start: 2025-02-05

## 2025-02-05 RX ORDER — TRAZODONE HYDROCHLORIDE 100 MG/1
200 TABLET ORAL NIGHTLY PRN
Qty: 60 TABLET | Refills: 0 | Status: SHIPPED | OUTPATIENT
Start: 2025-02-05

## 2025-02-05 RX ORDER — PROPRANOLOL HCL 20 MG
20 TABLET ORAL 2 TIMES DAILY
Qty: 60 TABLET | Refills: 1 | Status: SHIPPED | OUTPATIENT
Start: 2025-02-05

## 2025-02-11 ENCOUNTER — TELEMEDICINE (OUTPATIENT)
Dept: PSYCHIATRY | Facility: CLINIC | Age: 35
End: 2025-02-11
Payer: COMMERCIAL

## 2025-02-11 DIAGNOSIS — F41.1 GENERALIZED ANXIETY DISORDER: Primary | ICD-10-CM

## 2025-02-11 NOTE — PROGRESS NOTES
Date: February 11, 2025  Time In: 1500  Time Out: 1545  This provider is located at home address for [facility] (through [facility]), [address] [place] [address] using a secure VODECLIC Video Visit through [employer].     Mode of Visit: Video  Location of patient: -HOME-  Location of provider: +HOME+  You have chosen to receive care through a telehealth visit.  The patient has signed the video visit consent form.  The visit included audio and video interaction. No technical issues occurred during this visit.    The patient's condition being diagnosed/treated is appropriate for telemedicine. The provider identified herself as well as her credentials. The patient, and/or patients guardian, consent to be seen remotely, and when consent is given they understand that the consent allows for patient identifiable information to be sent to a third party as needed. They may refuse to be seen remotely at any time. The electronic data is encrypted and password protected, and the patient and/or guardian has been advised of the potential risks to privacy not withstanding such measures.     You have chosen to receive care through a telehealth visit.  Do you consent to use a video/audio connection for your medical care today? Yes    Reviewed by provider on 02/11/2025     PROGRESS NOTE  Data:  Kieran Canela is a 34 y.o. male who presents today for follow up    Chief Complaint: anxiety     History of Present Illness: Pt discussed stressful events that has occurred this year that were unexpected and caused increased anxiety. Pt reports that he has a plan of action to address the stressors that are within his control to address and to change. Pt reports short and long term goals for this year.       Clinical Maneuvering/Intervention:    (Scales based on 0 - 10 with 10 being the worst)  Depression:  Anxiety:        Assisted patient in processing above session content;  acknowledged and normalized patient’s thoughts, feelings, and concerns.  Rationalized patient thought process regarding recent stressors and life events. Discussed triggers associated with patient's emotions. Also discussed coping skills for patient to implement. Therapist assisted with processing emotions and thoughts correlated to identified stressors. Discussed limitations associated with identified stressors. Therapist offered alternative perspectives, support, and validation as needed.     Reviewed treatment goals, progress regarding identified goals and readiness for change through motivational interviewing approach. Treatment progress towards goals remains on going at this time.     Allowed patient to freely discuss issues without interruption or judgment. Assisted with application of appropriate intervention such as: cognitive behavioral therapy techniques, acceptance and commitment therapy methods, solution-focused brief therapy practices, psychoeducation, mindfulness approaches, emotional regulation strategies, attachment based assessments, and interpersonal interventions. Provided safe, confidential environment to facilitate the development of positive therapeutic relationship and encourage open, honest communication. Assisted patient in identifying risk factors which would indicate the need for higher level of care including thoughts to harm self or others and/or self-harming behavior and encouraged patient to contact this office, call 911, or present to the nearest emergency room should any of these events occur. Discussed crisis intervention services and means to access. Patient adamantly and convincingly denies current suicidal or homicidal ideation or perceptual disturbance.    Assessment:   Assessment   Patient appears to maintain relative stability as compared to their baseline.  However, patient continues to struggle with anxiety which continues to cause impairment in important areas of  functioning.  A result, they can be reasonably expected to continue to benefit from treatment and would likely be at increased risk for decompensation otherwise.    Mental Status Exam:   Hygiene:   good; normal for Pt   Cooperation:  Cooperative  Eye Contact:  Good  Psychomotor Behavior:  Appropriate  Affect:  Full range  Mood: normal  Speech:  Normal  Thought Process:  Linear  Thought Content:  Mood congruent  Suicidal:  None today  Homicidal:  None  Hallucinations:  None  Delusion:  None  Memory:  Intact  Orientation:  Person, Place, Time and Situation  Reliability:  fair  Insight:  Fair  Judgement:  Fair  Impulse Control:  Fair  Physical/Medical Issues:  No        Patient's Support Network Includes:  wife    Functional Status: Mild impairment     Progress toward goal: Not at goal    Prognosis: Fair with Ongoing Treatment            Plan:    Patient will continue in individual outpatient therapy with focus on improved functioning and coping skills, maintaining stability, and avoiding decompensation and the need for higher level of care.    Patient will adhere to medication regimen as prescribed and report any side effects. Patient will contact this office, call 911 or present to the nearest emergency room should suicidal or homicidal ideations occur.     Return in about 6 weeks, or earlier if symptoms worsen or fail to improve.           VISIT DIAGNOSIS:     ICD-10-CM ICD-9-CM   1. Generalized anxiety disorder  F41.1 300.02        Diagnoses and all orders for this visit:    1. Generalized anxiety disorder (Primary)           [facility] No Show Policy:  We understand unexpected circumstances arise; however, anytime you miss your appointment we are unable to provide you appropriate care.  In addition, each appointment missed could have been used to provide care for others.  We ask that you call at least 24 hours in advance to cancel or reschedule an appointment.  We would like to take this  opportunity to remind you of our policy stating patients who miss THREE or more appointments without cancelling or rescheduling 24 hours in advance of the appointment may be subject to cancellation of any further visits with our clinic and recommendation to seek in-person services/visits.    Please call [phone] to reschedule your appointment. If there are reasons that make it difficult for you to keep the appointments, please call and let us know how we can help.Please understand that medication prescribing will not continue without seeing your provider.      Patient to email any documentation or needed paperwork to support staff at:   [email or web address]       This document has been electronically signed by Sherine Rooney LCSW.  February 11, 2025 15:56 EST      Part of this note may be an electronic transcription/translation of spoken language to printed text using the Dragon Dictation System.

## 2025-02-17 ENCOUNTER — OFFICE VISIT (OUTPATIENT)
Dept: ENDOCRINOLOGY | Facility: CLINIC | Age: 35
End: 2025-02-17
Payer: COMMERCIAL

## 2025-02-17 VITALS
DIASTOLIC BLOOD PRESSURE: 98 MMHG | BODY MASS INDEX: 32.75 KG/M2 | HEIGHT: 74 IN | HEART RATE: 63 BPM | WEIGHT: 255.2 LBS | OXYGEN SATURATION: 98 % | SYSTOLIC BLOOD PRESSURE: 142 MMHG

## 2025-02-17 DIAGNOSIS — E10.65 TYPE 1 DIABETES MELLITUS WITH HYPERGLYCEMIA: Primary | ICD-10-CM

## 2025-02-17 LAB
EXPIRATION DATE: ABNORMAL
EXPIRATION DATE: ABNORMAL
GLUCOSE BLDC GLUCOMTR-MCNC: 147 MG/DL (ref 70–130)
HBA1C MFR BLD: 7.9 % (ref 4.5–5.7)
Lab: ABNORMAL
Lab: ABNORMAL

## 2025-02-17 PROCEDURE — 82947 ASSAY GLUCOSE BLOOD QUANT: CPT | Performed by: PHYSICIAN ASSISTANT

## 2025-02-17 PROCEDURE — 99214 OFFICE O/P EST MOD 30 MIN: CPT | Performed by: PHYSICIAN ASSISTANT

## 2025-02-17 PROCEDURE — 83036 HEMOGLOBIN GLYCOSYLATED A1C: CPT | Performed by: PHYSICIAN ASSISTANT

## 2025-02-17 NOTE — PROGRESS NOTES
Office Note      Date: 2025  Patient Name: Kieran Canela  MRN: [id number]  : 1990    Chief Complaint   Patient presents with    Diabetes     Type I       History of Present Illness:   Kieran Canela is a 34 y.o. male who presents for Diabetes type 1.   Diagnosed:   Current RX: Humalog via Tandem pump    Bg checks are done:continuously with Dexcom  Hypoglycemia : rare    At last visit after changes were made in his pump settings, he did well until around Brittany when he was at home more and activity increased. He started having frequent hypoglycemia so he adjusted his basal rate back down to 0.8. Now having hyperglycemia.      Last A1c:  Hemoglobin A1C   Date Value Ref Range Status   2025 7.9 (A) 4.5 - 5.7 % Final   2023 9.1 4.4 - 6.6 % Final     The last 2 weeks of CGM data are reviewed.  0 % are low  37 % are in range  37 % are 180-250  26 % are >250  GMI: 8.3  The glycemic pattern shows: Glucose averages show general hyperglycemia, higher during the daytime trending down slightly overnight    Changes in health since last visit: none.     DM Health Maintenance:  Ophtho: - needs to schedule  Monofilament / Foot exam: 24; sees podiatry for plantar fascitis  Lipids/Statin: not taking a statin with last FLP showing  24  JOSE: 11/15/24   TSH: 24  Aspirin: not indicated  ACE/ARB: no     Diabetic Complications:  Eyes: No  Kidneys: No  Feet: Yes, describe: bilateral neuropathy in feet and legs  Heart: No      Subjective          Review of Systems:   Review of Systems   Constitutional:  Negative for activity change, appetite change and fatigue.   Respiratory:  Negative for chest tightness and shortness of breath.    Gastrointestinal:  Negative for abdominal pain.   Musculoskeletal:  Negative for myalgias.   Psychiatric/Behavioral:  The patient is not nervous/anxious.        The following portions of the patient's history were reviewed and updated as  "appropriate: allergies, current medications, past family history, past medical history, past social history, past surgical history, and problem list.    Objective     Visit Vitals  /98 (BP Location: Left arm, Patient Position: Sitting, Cuff Size: Adult)   Pulse 63   Ht 188 cm (74.02\")   Wt 116 kg (255 lb 3.2 oz)   SpO2 98%   BMI 32.75 kg/m²           Physical Exam:  Physical Exam  Constitutional:       Appearance: He is well-developed.   HENT:      Head: Normocephalic and atraumatic.      Right Ear: External ear normal.      Left Ear: External ear normal.   Eyes:      Conjunctiva/sclera: Conjunctivae normal.   Cardiovascular:      Rate and Rhythm: Normal rate.   Pulmonary:      Effort: Pulmonary effort is normal.   Musculoskeletal:         General: Normal range of motion.      Cervical back: Normal range of motion.   Skin:     General: Skin is warm and dry.   Psychiatric:         Behavior: Behavior normal.          Assessment / Plan      Assessment & Plan:  Diagnoses and all orders for this visit:    1. Type 1 diabetes mellitus with hyperglycemia (Primary)  Assessment & Plan:  Diabetes is worsening.   Continue current treatment regimen.  Recommended an ADA diet.  Regular aerobic exercise.  Reminded to get yearly retinal exam.    A1c has improved but GMI on pump has worsened.    Readjusted basal rate back up to closer to amount of basal insulin he is currently getting. Went up to 1.0- a gradual increase due to patient anxiety about hypoglycemia.     He will continue to work on entering carb counts accurately before meals.     Diabetes will be reassessed in 3 months    Orders:  -     POC Glucose, Blood  -     POC Glycosylated Hemoglobin (Hb A1C)        Return in about 3 months (around 5/17/2025) for Follow up.    Portions of this note were completed with voice recognition program.  Electronically signed by Shala Raines PA-C  [facility]  02/17/2025  "

## 2025-02-18 ENCOUNTER — TELEMEDICINE (OUTPATIENT)
Dept: PSYCHIATRY | Facility: CLINIC | Age: 35
End: 2025-02-18
Payer: COMMERCIAL

## 2025-02-18 DIAGNOSIS — F41.0 PANIC ANXIETY SYNDROME: ICD-10-CM

## 2025-02-18 DIAGNOSIS — Z79.899 MEDICATION MANAGEMENT: ICD-10-CM

## 2025-02-18 DIAGNOSIS — G47.9 SLEEP DISTURBANCE: ICD-10-CM

## 2025-02-18 RX ORDER — TRAZODONE HYDROCHLORIDE 100 MG/1
200 TABLET ORAL NIGHTLY PRN
Qty: 180 TABLET | Refills: 0 | Status: SHIPPED | OUTPATIENT
Start: 2025-02-18

## 2025-02-18 RX ORDER — FLUOXETINE 40 MG/1
CAPSULE ORAL
Qty: 30 CAPSULE | Refills: 0 | Status: SHIPPED | OUTPATIENT
Start: 2025-02-18
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
## 2025-02-19 NOTE — ASSESSMENT & PLAN NOTE
Diabetes is worsening.   Continue current treatment regimen.  Recommended an ADA diet.  Regular aerobic exercise.  Reminded to get yearly retinal exam.    A1c has improved but GMI on pump has worsened.    Readjusted basal rate back up to closer to amount of basal insulin he is currently getting. Went up to 1.0- a gradual increase due to patient anxiety about hypoglycemia.     He will continue to work on entering carb counts accurately before meals.     Diabetes will be reassessed in 3 months

## 2025-03-01 DIAGNOSIS — K21.9 GASTROESOPHAGEAL REFLUX DISEASE, UNSPECIFIED WHETHER ESOPHAGITIS PRESENT: ICD-10-CM

## 2025-03-03 RX ORDER — OMEPRAZOLE 20 MG/1
20 CAPSULE, DELAYED RELEASE ORAL DAILY
Qty: 90 CAPSULE | Refills: 0 | Status: SHIPPED | OUTPATIENT
Start: 2025-03-03

## 2025-03-17 ENCOUNTER — TELEMEDICINE (OUTPATIENT)
Dept: PSYCHIATRY | Facility: CLINIC | Age: 35
End: 2025-03-17
Payer: COMMERCIAL

## 2025-03-17 DIAGNOSIS — F41.0 PANIC ANXIETY SYNDROME: Primary | ICD-10-CM

## 2025-03-17 DIAGNOSIS — G47.9 SLEEP DISTURBANCE: ICD-10-CM

## 2025-03-17 DIAGNOSIS — Z79.899 MEDICATION MANAGEMENT: ICD-10-CM

## 2025-03-17 PROCEDURE — 99214 OFFICE O/P EST MOD 30 MIN: CPT

## 2025-03-17 RX ORDER — FLUOXETINE HYDROCHLORIDE 40 MG/1
CAPSULE ORAL
Qty: 90 CAPSULE | Refills: 0 | Status: SHIPPED | OUTPATIENT
Start: 2025-03-17

## 2025-05-05 ENCOUNTER — TELEPHONE (OUTPATIENT)
Dept: PSYCHIATRY | Facility: CLINIC | Age: 35
End: 2025-05-05
Payer: COMMERCIAL

## 2025-05-23 ENCOUNTER — OFFICE VISIT (OUTPATIENT)
Dept: FAMILY MEDICINE CLINIC | Facility: CLINIC | Age: 35
End: 2025-05-23
Payer: COMMERCIAL

## 2025-05-23 VITALS
DIASTOLIC BLOOD PRESSURE: 76 MMHG | WEIGHT: 260 LBS | BODY MASS INDEX: 33.37 KG/M2 | HEIGHT: 74 IN | SYSTOLIC BLOOD PRESSURE: 122 MMHG | TEMPERATURE: 97.4 F | HEART RATE: 61 BPM | RESPIRATION RATE: 18 BRPM | OXYGEN SATURATION: 95 %

## 2025-05-23 DIAGNOSIS — E78.00 PURE HYPERCHOLESTEROLEMIA: ICD-10-CM

## 2025-05-23 DIAGNOSIS — E10.649 TYPE 1 DIABETES MELLITUS WITH HYPOGLYCEMIA AND WITHOUT COMA: Primary | ICD-10-CM

## 2025-05-23 DIAGNOSIS — R53.82 CHRONIC FATIGUE: ICD-10-CM

## 2025-05-23 LAB
EXPIRATION DATE: NORMAL
Lab: NORMAL
POC ALBUMIN, URINE: 30 MG/L
POC CREATININE, URINE: 100 MG/DL
POC URINE ALB/CREA RATIO: 30

## 2025-05-23 PROCEDURE — 99214 OFFICE O/P EST MOD 30 MIN: CPT | Performed by: FAMILY MEDICINE

## 2025-05-23 PROCEDURE — 82570 ASSAY OF URINE CREATININE: CPT | Performed by: FAMILY MEDICINE

## 2025-05-23 PROCEDURE — 82044 UR ALBUMIN SEMIQUANTITATIVE: CPT | Performed by: FAMILY MEDICINE

## 2025-05-23 NOTE — PROGRESS NOTES
Subjective   Kieran Canela is a 34 y.o. male.     Weight Loss       He has been gaining weight the past 6 months  He is still on his pump and the dexcom  He notes when he takes sliding scale his sugars will drop and if he does not take sliding scale his sugars will climb  He notes that ozempic really helped improve control but he had N with this and would like to try something else      He has continued to be tired  He has been working 7 days a week      The following portions of the patient's history were reviewed and updated as appropriate: allergies, current medications, past family history, past medical history, past social history, past surgical history, and problem list.    Review of Systems   Constitutional:  Positive for weight loss.   Psychiatric/Behavioral: Negative.         Objective   Physical Exam  Vitals and nursing note reviewed.   Constitutional:       General: He is not in acute distress.     Appearance: Normal appearance. He is well-developed.   Cardiovascular:      Rate and Rhythm: Normal rate and regular rhythm.      Heart sounds: Normal heart sounds.   Pulmonary:      Effort: Pulmonary effort is normal.      Breath sounds: Normal breath sounds.   Neurological:      Mental Status: He is alert and oriented to person, place, and time.   Psychiatric:         Mood and Affect: Mood normal.         Behavior: Behavior normal.         Thought Content: Thought content normal.         Judgment: Judgment normal.       Assessment & Plan   Diagnoses and all orders for this visit:    1. Type 1 diabetes mellitus with hypoglycemia and without coma (Primary)  -     POC Albumin/Creatinine Ratio Urine  -     Tirzepatide 2.5 MG/0.5ML solution auto-injector; Inject 2.5 mg under the skin into the appropriate area as directed 1 (One) Time Per Week.  Dispense: 2 mL; Refill: 1  -     CBC & Differential  -     Comprehensive Metabolic Panel  -     Hemoglobin A1c    2. Chronic fatigue  -     Testosterone    3. Pure  hypercholesterolemia  -     Lipid Panel    I do think that he is an excellent candidated ro mounjaro with his DM and BMI of 33.  Will start him on this and work on titrating up and he will continue to closely monitor his glucose levels  Will check T for fatigue as well as lipids.    F/u in 3 months, call with how meds are working and we will call him with lab results

## 2025-05-24 LAB
ALBUMIN SERPL-MCNC: 4.7 G/DL (ref 4.1–5.1)
ALP SERPL-CCNC: 140 IU/L (ref 44–121)
ALT SERPL-CCNC: 62 IU/L (ref 0–44)
AST SERPL-CCNC: 43 IU/L (ref 0–40)
BASOPHILS # BLD AUTO: 0.1 X10E3/UL (ref 0–0.2)
BASOPHILS NFR BLD AUTO: 1 %
BILIRUB SERPL-MCNC: 0.4 MG/DL (ref 0–1.2)
BUN SERPL-MCNC: 15 MG/DL (ref 6–20)
BUN/CREAT SERPL: 17 (ref 9–20)
CALCIUM SERPL-MCNC: 9.5 MG/DL (ref 8.7–10.2)
CHLORIDE SERPL-SCNC: 99 MMOL/L (ref 96–106)
CHOLEST SERPL-MCNC: 213 MG/DL (ref 100–199)
CO2 SERPL-SCNC: 22 MMOL/L (ref 20–29)
CREAT SERPL-MCNC: 0.87 MG/DL (ref 0.76–1.27)
EGFRCR SERPLBLD CKD-EPI 2021: 118 ML/MIN/1.73
EOSINOPHIL # BLD AUTO: 0.3 X10E3/UL (ref 0–0.4)
EOSINOPHIL NFR BLD AUTO: 5 %
ERYTHROCYTE [DISTWIDTH] IN BLOOD BY AUTOMATED COUNT: 13.1 % (ref 11.6–15.4)
GLOBULIN SER CALC-MCNC: 2.3 G/DL (ref 1.5–4.5)
GLUCOSE SERPL-MCNC: 161 MG/DL (ref 70–99)
HBA1C MFR BLD: 8.2 % (ref 4.8–5.6)
HCT VFR BLD AUTO: 47.4 % (ref 37.5–51)
HDLC SERPL-MCNC: 57 MG/DL
HGB BLD-MCNC: 15.5 G/DL (ref 13–17.7)
IMM GRANULOCYTES # BLD AUTO: 0 X10E3/UL (ref 0–0.1)
IMM GRANULOCYTES NFR BLD AUTO: 0 %
LDLC SERPL CALC-MCNC: 135 MG/DL (ref 0–99)
LYMPHOCYTES # BLD AUTO: 1.7 X10E3/UL (ref 0.7–3.1)
LYMPHOCYTES NFR BLD AUTO: 35 %
MCH RBC QN AUTO: 29.5 PG (ref 26.6–33)
MCHC RBC AUTO-ENTMCNC: 32.7 G/DL (ref 31.5–35.7)
MCV RBC AUTO: 90 FL (ref 79–97)
MONOCYTES # BLD AUTO: 0.6 X10E3/UL (ref 0.1–0.9)
MONOCYTES NFR BLD AUTO: 11 %
NEUTROPHILS # BLD AUTO: 2.4 X10E3/UL (ref 1.4–7)
NEUTROPHILS NFR BLD AUTO: 48 %
PLATELET # BLD AUTO: 343 X10E3/UL (ref 150–450)
POTASSIUM SERPL-SCNC: 4.3 MMOL/L (ref 3.5–5.2)
PROT SERPL-MCNC: 7 G/DL (ref 6–8.5)
RBC # BLD AUTO: 5.26 X10E6/UL (ref 4.14–5.8)
SODIUM SERPL-SCNC: 136 MMOL/L (ref 134–144)
TESTOST SERPL-MCNC: 611 NG/DL (ref 264–916)
TRIGL SERPL-MCNC: 116 MG/DL (ref 0–149)
VLDLC SERPL CALC-MCNC: 21 MG/DL (ref 5–40)
WBC # BLD AUTO: 5.1 X10E3/UL (ref 3.4–10.8)

## 2025-06-04 ENCOUNTER — TELEPHONE (OUTPATIENT)
Dept: FAMILY MEDICINE CLINIC | Facility: CLINIC | Age: 35
End: 2025-06-04
Payer: COMMERCIAL

## 2025-06-04 ENCOUNTER — TELEPHONE (OUTPATIENT)
Dept: ENDOCRINOLOGY | Facility: CLINIC | Age: 35
End: 2025-06-04

## 2025-06-04 NOTE — TELEPHONE ENCOUNTER
Caller: Kieran Canela    Relationship: Self    Best call back number: [phone]     Caller requesting test results: PATIENT    What test was performed: LABS    When was the test performed: 5/23/25    Where was the test performed: IN OFFICE    Additional notes: PHONE CALL PLEASE.  ADDITIONAL QUESTIONS.

## 2025-06-04 NOTE — TELEPHONE ENCOUNTER
Spoke with patient.  Advised that we have not received paperwork from [facility].  He is going to contact them and have them send it over.

## 2025-06-04 NOTE — TELEPHONE ENCOUNTER
PATIENT IS NEEDING US TO SEND DEXCOM G7 SENSORS/TRANSMITTER AND TUBING AND ALL OTHER SUPPLIES THAT GO WITH THE TANDEM PUMP TO HIS PHARMACY. [facility] IS THE SUPPLIER WHERE THE SUPPLIES COME FROM AND PATIENT STATES THEY HAVE SENT US PAPER WORK TO FILL OUT TO GET THE SUPPLIES.. PHONE NUMBER [phone]

## 2025-06-06 ENCOUNTER — TELEPHONE (OUTPATIENT)
Dept: ENDOCRINOLOGY | Facility: CLINIC | Age: 35
End: 2025-06-06
Payer: COMMERCIAL

## 2025-06-06 NOTE — TELEPHONE ENCOUNTER
Pt called and asked if we can call [facility] to have them fill out PA and a send in a prescription. Pt stated he has spoken to them the past 3 days and is getting no where. Pt asked if we could call. I did not see anything in his chart from them. He said they told him they faxed the PA. Trudysale[facility]'s number is  [phone].

## 2025-06-09 ENCOUNTER — TELEMEDICINE (OUTPATIENT)
Dept: PSYCHIATRY | Facility: CLINIC | Age: 35
End: 2025-06-09
Payer: COMMERCIAL

## 2025-06-09 DIAGNOSIS — F33.1 MODERATE EPISODE OF RECURRENT MAJOR DEPRESSIVE DISORDER: Primary | ICD-10-CM

## 2025-06-09 DIAGNOSIS — Z79.899 MEDICATION MANAGEMENT: ICD-10-CM

## 2025-06-09 DIAGNOSIS — G47.9 SLEEP DISTURBANCE: ICD-10-CM

## 2025-06-09 DIAGNOSIS — F41.0 PANIC ANXIETY SYNDROME: ICD-10-CM

## 2025-06-09 PROCEDURE — 99214 OFFICE O/P EST MOD 30 MIN: CPT

## 2025-06-09 RX ORDER — PROPRANOLOL HCL 20 MG
20 TABLET ORAL 2 TIMES DAILY
Qty: 60 TABLET | Refills: 1 | Status: SHIPPED | OUTPATIENT
Start: 2025-06-09

## 2025-06-09 RX ORDER — TRAZODONE HYDROCHLORIDE 100 MG/1
200 TABLET ORAL NIGHTLY PRN
Qty: 180 TABLET | Refills: 0 | Status: SHIPPED | OUTPATIENT
Start: 2025-06-09

## 2025-06-09 RX ORDER — FLUOXETINE HYDROCHLORIDE 40 MG/1
CAPSULE ORAL
Qty: 90 CAPSULE | Refills: 0 | Status: SHIPPED | OUTPATIENT
Start: 2025-06-09
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
## 2025-06-27 ENCOUNTER — OFFICE VISIT (OUTPATIENT)
Dept: ENDOCRINOLOGY | Facility: CLINIC | Age: 35
End: 2025-06-27
Payer: COMMERCIAL

## 2025-06-27 ENCOUNTER — PRIOR AUTHORIZATION (OUTPATIENT)
Dept: ENDOCRINOLOGY | Facility: CLINIC | Age: 35
End: 2025-06-27

## 2025-06-27 VITALS
WEIGHT: 260.2 LBS | OXYGEN SATURATION: 96 % | HEART RATE: 64 BPM | HEIGHT: 74 IN | DIASTOLIC BLOOD PRESSURE: 78 MMHG | SYSTOLIC BLOOD PRESSURE: 110 MMHG | BODY MASS INDEX: 33.39 KG/M2

## 2025-06-27 DIAGNOSIS — E10.65 TYPE 1 DIABETES MELLITUS WITH HYPERGLYCEMIA: Primary | ICD-10-CM

## 2025-06-27 DIAGNOSIS — R74.8 ELEVATED LIVER ENZYMES: ICD-10-CM

## 2025-06-27 DIAGNOSIS — E78.2 MIXED HYPERLIPIDEMIA: ICD-10-CM

## 2025-06-27 DIAGNOSIS — E66.09 CLASS 1 OBESITY DUE TO EXCESS CALORIES WITH SERIOUS COMORBIDITY AND BODY MASS INDEX (BMI) OF 33.0 TO 33.9 IN ADULT: ICD-10-CM

## 2025-06-27 DIAGNOSIS — E66.811 CLASS 1 OBESITY DUE TO EXCESS CALORIES WITH SERIOUS COMORBIDITY AND BODY MASS INDEX (BMI) OF 33.0 TO 33.9 IN ADULT: ICD-10-CM

## 2025-06-27 LAB
ALBUMIN SERPL-MCNC: 4.3 G/DL (ref 3.5–5.2)
ALBUMIN UR-MCNC: <1.2 MG/DL
ALBUMIN/GLOB SERPL: 1.7 G/DL
ALP SERPL-CCNC: 150 U/L (ref 39–117)
ALT SERPL W P-5'-P-CCNC: 39 U/L (ref 1–41)
ANION GAP SERPL CALCULATED.3IONS-SCNC: 12 MMOL/L (ref 5–15)
AST SERPL-CCNC: 22 U/L (ref 1–40)
BILIRUB SERPL-MCNC: 0.3 MG/DL (ref 0–1.2)
BUN SERPL-MCNC: 12 MG/DL (ref 6–20)
BUN/CREAT SERPL: 12.5 (ref 7–25)
CALCIUM SPEC-SCNC: 9.3 MG/DL (ref 8.6–10.5)
CHLORIDE SERPL-SCNC: 103 MMOL/L (ref 98–107)
CO2 SERPL-SCNC: 24 MMOL/L (ref 22–29)
CREAT SERPL-MCNC: 0.96 MG/DL (ref 0.76–1.27)
CREAT UR-MCNC: 68.8 MG/DL
EGFRCR SERPLBLD CKD-EPI 2021: 107.7 ML/MIN/1.73
EXPIRATION DATE: ABNORMAL
GLOBULIN UR ELPH-MCNC: 2.5 GM/DL
GLUCOSE BLDC GLUCOMTR-MCNC: 219 MG/DL (ref 70–130)
GLUCOSE SERPL-MCNC: 162 MG/DL (ref 65–99)
Lab: ABNORMAL
MICROALBUMIN/CREAT UR: NORMAL MG/G{CREAT}
POTASSIUM SERPL-SCNC: 4.3 MMOL/L (ref 3.5–5.2)
PROT SERPL-MCNC: 6.8 G/DL (ref 6–8.5)
SODIUM SERPL-SCNC: 139 MMOL/L (ref 136–145)
TSH SERPL DL<=0.05 MIU/L-ACNC: 2.69 UIU/ML (ref 0.27–4.2)

## 2025-06-27 PROCEDURE — 82043 UR ALBUMIN QUANTITATIVE: CPT | Performed by: PHYSICIAN ASSISTANT

## 2025-06-27 PROCEDURE — 82570 ASSAY OF URINE CREATININE: CPT | Performed by: PHYSICIAN ASSISTANT

## 2025-06-27 PROCEDURE — 80053 COMPREHEN METABOLIC PANEL: CPT | Performed by: PHYSICIAN ASSISTANT

## 2025-06-27 PROCEDURE — 84443 ASSAY THYROID STIM HORMONE: CPT | Performed by: PHYSICIAN ASSISTANT

## 2025-06-27 RX ORDER — INSULIN LISPRO 100 [IU]/ML
INJECTION, SOLUTION INTRAVENOUS; SUBCUTANEOUS
Qty: 90 ML | Refills: 1 | Status: SHIPPED | OUTPATIENT
Start: 2025-06-27

## 2025-06-27 NOTE — ASSESSMENT & PLAN NOTE
Diabetes is worsening.   Medication changes per orders.  Recommended an ADA diet.  Regular aerobic exercise.  Discussed foot care.    Resent tirzepatide as Zepbound to see if this will be covered by patient's insurance.  He would benefit from a GLP-1 RA to help with weight loss and decrease insulin requirements.  He will start with 2.5 mg weekly and we can titrate up every 4 weeks as tolerated.    I also made some changes in his pump settings, changed correction factor from 1: 40 to 1: 35 from 12 AM to 6 AM and from 1: 35 down to 1: 30 from 6 AM to 12 AM.  Changed carb ratio from 1: 12 down to 1: 10 from 6 AM to 12 AM.    Eye exam up-to-date, foot exam done today, fasting labs and urine microalbumin creatinine ratio up-to-date.    Diabetes will be reassessed in 3 months

## 2025-06-27 NOTE — PROGRESS NOTES
Office Note      Date: 2025  Patient Name: Kieran Canela  MRN: [id number]  : 1990    Chief Complaint   Patient presents with    Diabetes     Type I Diabetes Mellitus with Hyperglycemia       History of Present Illness:   Kieran Canela is a 34 y.o. male who presents for Diabetes type 1.   Diagnosed:   Current RX: Humalog via [employer] pump     Bg checks are done:continuously with Dexcom  Hypoglycemia : rare    Patient was prescribed Mounjaro by his PCP, was not able to get it and has not heard anything about it being approved. He wants to try this because he is struggling with weight gain.  Previously took Ozempic but did not tolerate it.    Notes that he is having hyperglycemia overnight, feels like his carb counts are not working well for his dinner meal.    Last A1c:  Hemoglobin A1C   Date Value Ref Range Status   2025 8.2 (H) 4.8 - 5.6 % Final     Comment:              Prediabetes: 5.7 - 6.4           Diabetes: >6.4           Glycemic control for adults with diabetes: <7.0     2025 7.9 (A) 4.5 - 5.7 % Final   2023 9.1 4.4 - 6.6 % Final     The last 2 weeks of CGM data are reviewed.  0 % are low  29 % are in range  39 % are 180-250  31 % are >250  GMI: 8.6  The glycemic pattern shows: glucose averages in hyperglycemia range, averaging around 200; daily report shows postprandial hyperglycemia    Changes in health since last visit: had some labs that showed slight elevation of liver enzymes.     DM Health Maintenance:  Ophtho:   Monofilament / Foot exam: 25 ; sees podiatry for plantar fascitis  Lipids/Statin: not taking a statin with last FLP showing  on 2025  JOSE: 2025  TSH: ordered 25   Aspirin: not indicated  ACE/ARB: no     Diabetic Complications:  Eyes: No  Kidneys: No  Feet: Yes, describe: bilateral neuropathy in feet and legs  Heart: No    Subjective          Review of Systems:   Review of Systems   Constitutional:  Negative  "for activity change, appetite change and fatigue.   Respiratory:  Negative for chest tightness and shortness of breath.    Gastrointestinal:  Negative for abdominal pain.   Musculoskeletal:  Negative for myalgias.   Neurological:  Negative for numbness.   Psychiatric/Behavioral:  The patient is not nervous/anxious.        The following portions of the patient's history were reviewed and updated as appropriate: allergies, current medications, past family history, past medical history, past social history, past surgical history, and problem list.    Objective     Visit Vitals  /78 (BP Location: Left arm, Patient Position: Sitting, Cuff Size: Adult)   Pulse 64   Ht 188 cm (74.02\")   Wt 118 kg (260 lb 3.2 oz)   SpO2 96%   BMI 33.39 kg/m²           Physical Exam:  Physical Exam  Constitutional:       Appearance: He is well-developed.   HENT:      Head: Normocephalic and atraumatic.      Right Ear: External ear normal.      Left Ear: External ear normal.   Eyes:      Conjunctiva/sclera: Conjunctivae normal.   Cardiovascular:      Pulses:           Dorsalis pedis pulses are 2+ on the right side and 2+ on the left side.        Posterior tibial pulses are 2+ on the right side and 2+ on the left side.   Pulmonary:      Effort: Pulmonary effort is normal.   Musculoskeletal:         General: Normal range of motion.      Cervical back: Normal range of motion.   Feet:      Right foot:      Protective Sensation: 10 sites tested.  10 sites sensed.      Skin integrity: Skin integrity normal.      Toenail Condition: Right toenails are normal.      Left foot:      Protective Sensation: 10 sites tested.  10 sites sensed.      Skin integrity: Skin integrity normal.      Toenail Condition: Left toenails are normal.      Comments: Diabetic Foot Exam Performed and Monofilament Test Performed     Skin:     General: Skin is warm and dry.   Psychiatric:         Behavior: Behavior normal.          Assessment / Plan      Assessment & " Plan:  Diagnoses and all orders for this visit:    1. Type 1 diabetes mellitus with hyperglycemia (Primary)  Assessment & Plan:  Diabetes is worsening.   Medication changes per orders.  Recommended an ADA diet.  Regular aerobic exercise.  Discussed foot care.    Resent tirzepatide as Zepbound to see if this will be covered by patient's insurance.  He would benefit from a GLP-1 RA to help with weight loss and decrease insulin requirements.  He will start with 2.5 mg weekly and we can titrate up every 4 weeks as tolerated.    I also made some changes in his pump settings, changed correction factor from 1: 40 to 1: 35 from 12 AM to 6 AM and from 1: 35 down to 1: 30 from 6 AM to 12 AM.  Changed carb ratio from 1: 12 down to 1: 10 from 6 AM to 12 AM.    Eye exam up-to-date, foot exam done today, fasting labs and urine microalbumin creatinine ratio up-to-date.    Diabetes will be reassessed in 3 months    Orders:  -     POC Glucose, Blood  -     Comprehensive Metabolic Panel; Future  -     TSH Rfx On Abnormal To Free T4; Future  -     HumaLOG 100 UNIT/ML injection; Inject up to 100 units daily in insulin pump  Dispense: 90 mL; Refill: 1  -     Microalbumin / Creatinine Urine Ratio - Urine, Clean Catch  -     Comprehensive Metabolic Panel  -     TSH Rfx On Abnormal To Free T4    2. Class 1 obesity due to excess calories with serious comorbidity and body mass index (BMI) of 33.0 to 33.9 in adult  Assessment & Plan:  Patient's (Body mass index is 33.39 kg/m².) indicates that they are obese (BMI >30) with health conditions that include dyslipidemias . Weight is worsening. BMI  is above average; BMI management plan is completed. We discussed portion control, increasing exercise, and pharmacologic options including Zepbound.     Orders:  -     Tirzepatide-Weight Management (ZEPBOUND) 2.5 MG/0.5ML solution auto-injector; Inject 0.5 mL under the skin into the appropriate area as directed 1 (One) Time Per Week.  Dispense: 2 mL;  Refill: 1    3. Mixed hyperlipidemia  Assessment & Plan:   Lipid abnormalities are worsening    Plan:  Lipid lowering therapy not prescribed due to patient will be working on lifestyle changes with the addition of GLP-1 RA    Discussed medication dosage, use, side effects, and goals of treatment in detail.    Counseled patient on lifestyle modifications to help control hyperlipidemia.     Patient Treatment Goals:   LDL goal is less than 70    Followup at the next regular appointment.      4. Elevated liver enzymes  Comments:  Discussed decreasing alcohol and Tylenol intake.  May also be related to some underlying fatty liver, will recheck metabolic panel today.        Return in about 3 months (around 9/27/2025) for Follow up.    Portions of this note were completed with voice recognition program.  Electronically signed by Shala Raines PA-C  [facility] [place]  06/27/2025

## 2025-06-27 NOTE — ASSESSMENT & PLAN NOTE
Lipid abnormalities are worsening    Plan:  Lipid lowering therapy not prescribed due to patient will be working on lifestyle changes with the addition of GLP-1 RA    Discussed medication dosage, use, side effects, and goals of treatment in detail.    Counseled patient on lifestyle modifications to help control hyperlipidemia.     Patient Treatment Goals:   LDL goal is less than 70    Followup at the next regular appointment.

## 2025-06-27 NOTE — ASSESSMENT & PLAN NOTE
Patient's (Body mass index is 33.39 kg/m².) indicates that they are obese (BMI >30) with health conditions that include dyslipidemias . Weight is worsening. BMI  is above average; BMI management plan is completed. We discussed portion control, increasing exercise, and pharmacologic options including Zepbound.

## 2025-08-15 DIAGNOSIS — E66.811 CLASS 1 OBESITY DUE TO EXCESS CALORIES WITH SERIOUS COMORBIDITY AND BODY MASS INDEX (BMI) OF 33.0 TO 33.9 IN ADULT: ICD-10-CM

## 2025-08-15 DIAGNOSIS — E66.09 CLASS 1 OBESITY DUE TO EXCESS CALORIES WITH SERIOUS COMORBIDITY AND BODY MASS INDEX (BMI) OF 33.0 TO 33.9 IN ADULT: ICD-10-CM

## 2025-08-17 RX ORDER — TIRZEPATIDE 2.5 MG/.5ML
INJECTION, SOLUTION SUBCUTANEOUS
Qty: 4 ML | Refills: 1 | Status: SHIPPED | OUTPATIENT
Start: 2025-08-17 | End: 2025-08-18

## 2025-08-18 RX ORDER — TIRZEPATIDE 5 MG/.5ML
5 INJECTION, SOLUTION SUBCUTANEOUS WEEKLY
Qty: 2 ML | Refills: 2 | Status: SHIPPED | OUTPATIENT
Start: 2025-08-18

## 2025-08-22 ENCOUNTER — OFFICE VISIT (OUTPATIENT)
Dept: FAMILY MEDICINE CLINIC | Facility: CLINIC | Age: 35
End: 2025-08-22
Payer: COMMERCIAL

## 2025-08-22 VITALS
BODY MASS INDEX: 31.06 KG/M2 | OXYGEN SATURATION: 96 % | RESPIRATION RATE: 20 BRPM | HEIGHT: 74 IN | TEMPERATURE: 101 F | HEART RATE: 105 BPM | WEIGHT: 242 LBS | DIASTOLIC BLOOD PRESSURE: 80 MMHG | SYSTOLIC BLOOD PRESSURE: 126 MMHG

## 2025-08-22 DIAGNOSIS — M25.50 ARTHRALGIA, UNSPECIFIED JOINT: Primary | ICD-10-CM

## 2025-08-22 DIAGNOSIS — B35.6 TINEA CRURIS: ICD-10-CM

## 2025-08-22 RX ORDER — IBUPROFEN 800 MG/1
800 TABLET, FILM COATED ORAL EVERY 8 HOURS PRN
Qty: 90 TABLET | Refills: 5 | Status: SHIPPED | OUTPATIENT
Start: 2025-08-22

## 2025-08-22 RX ORDER — CLOTRIMAZOLE 1 %
1 CREAM (GRAM) TOPICAL EVERY 12 HOURS SCHEDULED
Qty: 30 G | Refills: 1 | Status: SHIPPED | OUTPATIENT
Start: 2025-08-22

## 2025-08-25 ENCOUNTER — TELEMEDICINE (OUTPATIENT)
Dept: PSYCHIATRY | Facility: CLINIC | Age: 35
End: 2025-08-25
Payer: COMMERCIAL

## 2025-08-25 DIAGNOSIS — F41.0 PANIC ANXIETY SYNDROME: ICD-10-CM

## 2025-08-25 DIAGNOSIS — F33.1 MODERATE EPISODE OF RECURRENT MAJOR DEPRESSIVE DISORDER: Primary | ICD-10-CM

## 2025-08-25 DIAGNOSIS — Z79.899 MEDICATION MANAGEMENT: ICD-10-CM

## 2025-08-25 DIAGNOSIS — G47.9 SLEEP DISTURBANCE: ICD-10-CM

## 2025-08-25 PROCEDURE — 99214 OFFICE O/P EST MOD 30 MIN: CPT

## 2025-08-25 PROCEDURE — 96127 BRIEF EMOTIONAL/BEHAV ASSMT: CPT

## 2025-08-25 RX ORDER — OXYCODONE AND ACETAMINOPHEN 7.5; 325 MG/1; MG/1
1 TABLET ORAL EVERY 6 HOURS
COMMUNITY
Start: 2025-08-19

## 2025-08-25 RX ORDER — PROPRANOLOL HCL 20 MG
20 TABLET ORAL 2 TIMES DAILY
Qty: 60 TABLET | Refills: 0 | Status: SHIPPED | OUTPATIENT
Start: 2025-08-25

## 2025-08-25 RX ORDER — TRAZODONE HYDROCHLORIDE 100 MG/1
200 TABLET ORAL NIGHTLY PRN
Qty: 180 TABLET | Refills: 0 | Status: SHIPPED | OUTPATIENT
Start: 2025-08-25

## 2025-08-25 RX ORDER — FLUOXETINE HYDROCHLORIDE 40 MG/1
CAPSULE ORAL
Qty: 90 CAPSULE | Refills: 0 | Status: SHIPPED | OUTPATIENT
Start: 2025-08-25